# Patient Record
Sex: MALE | Race: WHITE | URBAN - METROPOLITAN AREA
[De-identification: names, ages, dates, MRNs, and addresses within clinical notes are randomized per-mention and may not be internally consistent; named-entity substitution may affect disease eponyms.]

---

## 2017-01-24 RX ORDER — ALFUZOSIN HYDROCHLORIDE 10 MG/1
TABLET, EXTENDED RELEASE ORAL
Qty: 90 TABLET | Refills: 3 | Status: SHIPPED | OUTPATIENT
Start: 2017-01-24 | End: 2018-01-19 | Stop reason: SDUPTHER

## 2017-10-13 DIAGNOSIS — N40.1 BENIGN PROSTATIC HYPERPLASIA WITH LOWER URINARY TRACT SYMPTOMS, UNSPECIFIED MORPHOLOGY: ICD-10-CM

## 2017-10-13 DIAGNOSIS — N40.1 BENIGN PROSTATIC HYPERPLASIA WITH LOWER URINARY TRACT SYMPTOMS, UNSPECIFIED MORPHOLOGY: Primary | ICD-10-CM

## 2017-10-13 LAB — PROSTATE SPECIFIC ANTIGEN: 2.13 NG/ML (ref 0–3.89)

## 2017-10-20 ENCOUNTER — OFFICE VISIT (OUTPATIENT)
Dept: UROLOGY | Age: 52
End: 2017-10-20

## 2017-10-20 VITALS
HEIGHT: 70 IN | HEART RATE: 65 BPM | WEIGHT: 185 LBS | BODY MASS INDEX: 26.48 KG/M2 | DIASTOLIC BLOOD PRESSURE: 80 MMHG | SYSTOLIC BLOOD PRESSURE: 122 MMHG

## 2017-10-20 DIAGNOSIS — N40.1 BPH WITH OBSTRUCTION/LOWER URINARY TRACT SYMPTOMS: Primary | ICD-10-CM

## 2017-10-20 DIAGNOSIS — N13.8 BPH WITH OBSTRUCTION/LOWER URINARY TRACT SYMPTOMS: Primary | ICD-10-CM

## 2017-10-20 PROCEDURE — 1036F TOBACCO NON-USER: CPT | Performed by: UROLOGY

## 2017-10-20 PROCEDURE — 3017F COLORECTAL CA SCREEN DOC REV: CPT | Performed by: UROLOGY

## 2017-10-20 PROCEDURE — 99213 OFFICE O/P EST LOW 20 MIN: CPT | Performed by: UROLOGY

## 2017-10-20 PROCEDURE — G8484 FLU IMMUNIZE NO ADMIN: HCPCS | Performed by: UROLOGY

## 2017-10-20 PROCEDURE — G8427 DOCREV CUR MEDS BY ELIG CLIN: HCPCS | Performed by: UROLOGY

## 2017-10-20 PROCEDURE — G8419 CALC BMI OUT NRM PARAM NOF/U: HCPCS | Performed by: UROLOGY

## 2017-10-20 ASSESSMENT — ENCOUNTER SYMPTOMS: SHORTNESS OF BREATH: 0

## 2017-10-20 NOTE — PROGRESS NOTES
Respiratory: Negative for shortness of breath. Cardiovascular: Negative for chest pain. Genitourinary: Negative for difficulty urinating, dysuria, enuresis, flank pain and hematuria. Objective:   Physical Exam   Constitutional: He appears well-developed and well-nourished. Genitourinary: Rectum normal. Rectal exam shows no external hemorrhoid. Prostate is enlarged. Prostate is not tender. Genitourinary Comments: Prostate remains irregular Lt > Rt but no nodules, stable. Neurological: He is alert. Psychiatric: He has a normal mood and affect. His behavior is normal.         PSA   Date Value Ref Range Status   10/13/2017 2.13 0.00 - 3.89 ng/mL Final   09/09/2016 1.92 0.00 - 3.89 ng/mL Final   10/16/2012 2.81 0.00 - 4.00 ng/mL Final   09/14/2012 5.1 (H) 0.0 - 4.0 ng/mL Final     Comment:     [facility], [facility] [address] [place], [address]  INTERPRETIVE INFORMATION: Prostate Specific Antigen  The Roche Modular E170 PSA method is used. Results obtained  with different assay methods or kits cannot be used  interchangeably. The Roche Modular E170 PSA method is  approved for use as an aid in the detection of prostate  cancer when used in conjunction with a digital rectal exam  in men age 48 and older. The Roche Modular E170 PSA method  is also indicated for the serial measurement of PSA to aid  in the prognosis and management of prostate cancer  patients. Elevated PSA concentrations can only suggest the  presence of prostate cancer until biopsy is performed. PSA  concentrations can also be elevated in benign prostatic  hyperplasia or inflammatory conditions of the prostate. PSA  is generally not elevated in healthy men or men with  non-prostatic carcinoma. Diagnostic Psa   Date Value Ref Range Status   05/10/2014 1.65 0.00 - 3.89 ng/mL Final       Assessment: This is a 45 yo white male with prior h/o UTI, irregular LATOSHA/elevated PSA (stable), BPH w/LUTs on alfuzosin (stable).  I reassured the pt of my findings today and recommend continue with present management for LUTs. Plan:      1.  F/U 1 yr for PSA

## 2018-01-19 RX ORDER — ALFUZOSIN HYDROCHLORIDE 10 MG/1
TABLET, EXTENDED RELEASE ORAL
Qty: 90 TABLET | Refills: 3 | Status: SHIPPED | OUTPATIENT
Start: 2018-01-19 | End: 2019-01-14 | Stop reason: SDUPTHER

## 2018-10-23 ENCOUNTER — TELEPHONE (OUTPATIENT)
Dept: UROLOGY | Age: 53
End: 2018-10-23

## 2018-10-23 DIAGNOSIS — N40.1 BPH WITH OBSTRUCTION/LOWER URINARY TRACT SYMPTOMS: Primary | ICD-10-CM

## 2018-10-23 DIAGNOSIS — N13.8 BPH WITH OBSTRUCTION/LOWER URINARY TRACT SYMPTOMS: Primary | ICD-10-CM

## 2018-10-23 NOTE — TELEPHONE ENCOUNTER
----- Message from Jamil Lu sent at 10/23/2018 11:29 AM EDT -----  Pt needs new psa order for future appt on 11/12/18

## 2018-11-09 DIAGNOSIS — N40.1 BPH WITH OBSTRUCTION/LOWER URINARY TRACT SYMPTOMS: ICD-10-CM

## 2018-11-09 DIAGNOSIS — N13.8 BPH WITH OBSTRUCTION/LOWER URINARY TRACT SYMPTOMS: ICD-10-CM

## 2018-11-09 LAB — PROSTATE SPECIFIC ANTIGEN: 2.61 NG/ML (ref 0–3.89)

## 2018-11-12 ENCOUNTER — OFFICE VISIT (OUTPATIENT)
Dept: UROLOGY | Age: 53
End: 2018-11-12
Payer: COMMERCIAL

## 2018-11-12 VITALS
HEART RATE: 64 BPM | BODY MASS INDEX: 30.35 KG/M2 | HEIGHT: 70 IN | DIASTOLIC BLOOD PRESSURE: 72 MMHG | SYSTOLIC BLOOD PRESSURE: 122 MMHG | WEIGHT: 212 LBS

## 2018-11-12 DIAGNOSIS — N40.1 BPH WITH OBSTRUCTION/LOWER URINARY TRACT SYMPTOMS: Primary | ICD-10-CM

## 2018-11-12 DIAGNOSIS — N13.8 BPH WITH OBSTRUCTION/LOWER URINARY TRACT SYMPTOMS: Primary | ICD-10-CM

## 2018-11-12 PROCEDURE — 1036F TOBACCO NON-USER: CPT | Performed by: UROLOGY

## 2018-11-12 PROCEDURE — G8427 DOCREV CUR MEDS BY ELIG CLIN: HCPCS | Performed by: UROLOGY

## 2018-11-12 PROCEDURE — G8417 CALC BMI ABV UP PARAM F/U: HCPCS | Performed by: UROLOGY

## 2018-11-12 PROCEDURE — 3017F COLORECTAL CA SCREEN DOC REV: CPT | Performed by: UROLOGY

## 2018-11-12 PROCEDURE — G8484 FLU IMMUNIZE NO ADMIN: HCPCS | Performed by: UROLOGY

## 2018-11-12 PROCEDURE — 99213 OFFICE O/P EST LOW 20 MIN: CPT | Performed by: UROLOGY

## 2018-11-12 ASSESSMENT — ENCOUNTER SYMPTOMS: SHORTNESS OF BREATH: 0

## 2019-01-14 RX ORDER — ALFUZOSIN HYDROCHLORIDE 10 MG/1
TABLET, EXTENDED RELEASE ORAL
Qty: 90 TABLET | Refills: 3 | Status: SHIPPED | OUTPATIENT
Start: 2019-01-14 | End: 2020-01-09

## 2020-01-13 RX ORDER — ALFUZOSIN HYDROCHLORIDE 10 MG/1
TABLET, EXTENDED RELEASE ORAL
Qty: 90 TABLET | Refills: 4 | Status: SHIPPED | OUTPATIENT
Start: 2020-01-13 | End: 2021-04-07

## 2020-01-16 ENCOUNTER — TELEPHONE (OUTPATIENT)
Dept: UROLOGY | Age: 55
End: 2020-01-16

## 2020-01-18 DIAGNOSIS — N13.8 BPH WITH OBSTRUCTION/LOWER URINARY TRACT SYMPTOMS: ICD-10-CM

## 2020-01-18 DIAGNOSIS — N40.1 BPH WITH OBSTRUCTION/LOWER URINARY TRACT SYMPTOMS: ICD-10-CM

## 2020-01-19 LAB — PROSTATE SPECIFIC ANTIGEN: 2.86 NG/ML (ref 0–3.89)

## 2020-01-24 ENCOUNTER — TELEPHONE (OUTPATIENT)
Dept: UROLOGY | Age: 55
End: 2020-01-24

## 2020-01-24 ENCOUNTER — OFFICE VISIT (OUTPATIENT)
Dept: UROLOGY | Age: 55
End: 2020-01-24
Payer: COMMERCIAL

## 2020-01-24 VITALS
SYSTOLIC BLOOD PRESSURE: 122 MMHG | WEIGHT: 200 LBS | HEIGHT: 70 IN | HEART RATE: 72 BPM | BODY MASS INDEX: 28.63 KG/M2 | DIASTOLIC BLOOD PRESSURE: 80 MMHG

## 2020-01-24 PROCEDURE — G8427 DOCREV CUR MEDS BY ELIG CLIN: HCPCS | Performed by: UROLOGY

## 2020-01-24 PROCEDURE — G8419 CALC BMI OUT NRM PARAM NOF/U: HCPCS | Performed by: UROLOGY

## 2020-01-24 PROCEDURE — 3017F COLORECTAL CA SCREEN DOC REV: CPT | Performed by: UROLOGY

## 2020-01-24 PROCEDURE — G8484 FLU IMMUNIZE NO ADMIN: HCPCS | Performed by: UROLOGY

## 2020-01-24 PROCEDURE — 1036F TOBACCO NON-USER: CPT | Performed by: UROLOGY

## 2020-01-24 PROCEDURE — 99213 OFFICE O/P EST LOW 20 MIN: CPT | Performed by: UROLOGY

## 2020-01-24 ASSESSMENT — ENCOUNTER SYMPTOMS
ABDOMINAL PAIN: 0
SHORTNESS OF BREATH: 0
ABDOMINAL DISTENTION: 0

## 2020-01-24 NOTE — PROGRESS NOTES
Subjective:      Patient ID: Rose Marie Erazo is a 47 y.o. male. HPI  This is a 46 yo white male with h/o UTI, irregular LATOSHA/elevated PSA, BPH w/LUTs on alfuzosin back in follow-up. Since last seen on 11/12/18, he has a good fos, no hematuria, no dysuria and no pain. He has no interval UTI's. He has no urgency or incontinence and denies frequency. He has no new surgical problems. I reviewed his interval PSA today. He has no SE from alfuzosin. He is being followed at [facility] for rib lesion that is felt to be benign but he wants a new PCP. He has no other complaints.         Trus/Prostate biopsy 10/12: neg  Cystoscopy for microhematuria 10/12: mild bulbous stricture, neg otherwise  Cytol/FISH : neg       Past Medical History:   Diagnosis Date    Hyperlipidemia     Incomplete bladder emptying     Prostate troubles      Past Surgical History:   Procedure Laterality Date    CYSTOSCOPY      PROSTATE SURGERY      biopsy    TONSILLECTOMY      VASECTOMY  2012     Social History     Socioeconomic History    Marital status:      Spouse name: None    Number of children: None    Years of education: None    Highest education level: None   Occupational History    None   Social Needs    Financial resource strain: None    Food insecurity:     Worry: None     Inability: None    Transportation needs:     Medical: None     Non-medical: None   Tobacco Use    Smoking status: Former Smoker     Types: Cigarettes     Last attempt to quit: 1/2/2017     Years since quitting: 3.0    Smokeless tobacco: Never Used   Substance and Sexual Activity    Alcohol use:  Yes     Alcohol/week: 2.0 standard drinks     Types: 2 Cans of beer per week     Comment: a year    Drug use: No    Sexual activity: None   Lifestyle    Physical activity:     Days per week: None     Minutes per session: None    Stress: None   Relationships    Social connections:     Talks on phone: None     Gets together: None     Attends Buddhism service: None     Active member of club or organization: None     Attends meetings of clubs or organizations: None     Relationship status: None    Intimate partner violence:     Fear of current or ex partner: None     Emotionally abused: None     Physically abused: None     Forced sexual activity: None   Other Topics Concern    None   Social History Narrative    None     Family History   Problem Relation Age of Onset    Cancer Mother         bladder    Arthritis Mother     High Blood Pressure Mother     Heart Attack Father     Alzheimer's Disease Father     Stroke Father     High Blood Pressure Father     Heart Disease Father      Current Outpatient Medications   Medication Sig Dispense Refill    alfuzosin (UROXATRAL) 10 MG extended release tablet TAKE 1 TABLET DAILY 90 tablet 4     No current facility-administered medications for this visit. Ciprofloxacin  reviewed      Review of Systems   Constitutional: Negative for fever and unexpected weight change. Respiratory: Negative for shortness of breath. Cardiovascular: Negative for chest pain. Gastrointestinal: Negative for abdominal distention and abdominal pain. Genitourinary: Negative for dysuria, flank pain and hematuria. Objective:   Physical Exam  Genitourinary:     Prostate: Enlarged. Not tender and no nodules present. Rectum: No external hemorrhoid. Comments: Prostate remains irregular Lt > Rt at apex but stable. PSA at [facility] 11/1/19: 3.08 ng/ml  PSA   Date Value Ref Range Status   01/18/2020 2.86 0.00 - 3.89 ng/mL Final   11/09/2018 2.61 0.00 - 3.89 ng/mL Final   10/13/2017 2.13 0.00 - 3.89 ng/mL Final   09/09/2016 1.92 0.00 - 3.89 ng/mL Final   10/16/2012 2.81 0.00 - 4.00 ng/mL Final     Diagnostic Psa   Date Value Ref Range Status   05/10/2014 1.65 0.00 - 3.89 ng/mL Final       Assessment:       This is a 48 yo white male with h/o UTI (not recurrent), irregular LATOSHA with normal PSA (stable) and prior negative biopsy, BPH w/LUTs on alfuzosin (stable). I recommend he continue with yearly follow-up and will get him set up with [facility] PCP in [facility]. Plan:      1. F/u 1 yr for PSA  2.  Refer to Dr Terri Evans at [facility] as new PCP        Reba Briceño MD

## 2021-01-16 DIAGNOSIS — N40.1 BPH WITH OBSTRUCTION/LOWER URINARY TRACT SYMPTOMS: ICD-10-CM

## 2021-01-16 DIAGNOSIS — N13.8 BPH WITH OBSTRUCTION/LOWER URINARY TRACT SYMPTOMS: ICD-10-CM

## 2021-01-16 LAB — PROSTATE SPECIFIC ANTIGEN: 2.65 NG/ML (ref 0–3.89)

## 2021-01-22 ENCOUNTER — OFFICE VISIT (OUTPATIENT)
Dept: UROLOGY | Age: 56
End: 2021-01-22
Payer: COMMERCIAL

## 2021-01-22 VITALS
BODY MASS INDEX: 30.06 KG/M2 | WEIGHT: 210 LBS | SYSTOLIC BLOOD PRESSURE: 122 MMHG | HEIGHT: 70 IN | DIASTOLIC BLOOD PRESSURE: 80 MMHG | HEART RATE: 65 BPM

## 2021-01-22 DIAGNOSIS — N40.1 BPH WITH OBSTRUCTION/LOWER URINARY TRACT SYMPTOMS: Primary | ICD-10-CM

## 2021-01-22 DIAGNOSIS — N13.8 BPH WITH OBSTRUCTION/LOWER URINARY TRACT SYMPTOMS: Primary | ICD-10-CM

## 2021-01-22 PROCEDURE — 3017F COLORECTAL CA SCREEN DOC REV: CPT | Performed by: UROLOGY

## 2021-01-22 PROCEDURE — G8484 FLU IMMUNIZE NO ADMIN: HCPCS | Performed by: UROLOGY

## 2021-01-22 PROCEDURE — G8427 DOCREV CUR MEDS BY ELIG CLIN: HCPCS | Performed by: UROLOGY

## 2021-01-22 PROCEDURE — G8417 CALC BMI ABV UP PARAM F/U: HCPCS | Performed by: UROLOGY

## 2021-01-22 PROCEDURE — 1036F TOBACCO NON-USER: CPT | Performed by: UROLOGY

## 2021-01-22 PROCEDURE — 99213 OFFICE O/P EST LOW 20 MIN: CPT | Performed by: UROLOGY

## 2021-01-22 ASSESSMENT — ENCOUNTER SYMPTOMS
ABDOMINAL PAIN: 0
ABDOMINAL DISTENTION: 0

## 2021-01-22 NOTE — PROGRESS NOTES
Subjective:      Patient ID: Ricardo Restrepo is a 54 y.o. male. HPI  This is a 45 yo white male with h/o UTI, irregular LATOSHA/elevated PSA, BPH w/LUTs on alfuzosin back in follow-up. Since last seen on 20, he has a good fos, no hematuria, no dysuria and no pain. He has no interval UTI's. He has no urgency or incontinence and denies frequency. He has no new surgical or medical problems. I reviewed his interval PSA today. He has no SE from alfuzosin. He has no other complaints.         Trus/Prostate biopsy 10/12: neg  Cystoscopy for microhematuria 10/12: mild bulbous stricture, neg otherwise  Cytol/FISH : neg      Past Medical History:   Diagnosis Date    Hyperlipidemia     Incomplete bladder emptying     Prostate troubles      Past Surgical History:   Procedure Laterality Date    CYSTOSCOPY      PROSTATE SURGERY      biopsy    TONSILLECTOMY      VASECTOMY  2012     Social History     Socioeconomic History    Marital status:      Spouse name: None    Number of children: None    Years of education: None    Highest education level: None   Occupational History    None   Social Needs    Financial resource strain: None    Food insecurity     Worry: None     Inability: None    Transportation needs     Medical: None     Non-medical: None   Tobacco Use    Smoking status: Former Smoker     Types: Cigarettes     Quit date: 2017     Years since quittin.0    Smokeless tobacco: Never Used   Substance and Sexual Activity    Alcohol use:  Yes     Alcohol/week: 2.0 standard drinks     Types: 2 Cans of beer per week     Comment: a year    Drug use: No    Sexual activity: None   Lifestyle    Physical activity     Days per week: None     Minutes per session: None    Stress: None   Relationships    Social connections     Talks on phone: None     Gets together: None     Attends Anabaptist service: None     Active member of club or organization: None Attends meetings of clubs or organizations: None     Relationship status: None    Intimate partner violence     Fear of current or ex partner: None     Emotionally abused: None     Physically abused: None     Forced sexual activity: None   Other Topics Concern    None   Social History Narrative    None     Family History   Problem Relation Age of Onset    Cancer Mother         bladder    Arthritis Mother     High Blood Pressure Mother     Heart Attack Father     Alzheimer's Disease Father     Stroke Father     High Blood Pressure Father     Heart Disease Father      Current Outpatient Medications   Medication Sig Dispense Refill    alfuzosin (UROXATRAL) 10 MG extended release tablet TAKE 1 TABLET DAILY 90 tablet 4     No current facility-administered medications for this visit. Ciprofloxacin  reviewed    Review of Systems   Constitutional: Negative for unexpected weight change. Gastrointestinal: Negative for abdominal distention and abdominal pain. Genitourinary: Negative for decreased urine volume, dysuria, flank pain and hematuria. Objective:   Physical Exam  Constitutional:       Appearance: Normal appearance. Genitourinary:     Prostate: Enlarged. Not tender. Rectum: No external hemorrhoid. Comments: Prostate remains irregular Lt > Rt at apex but stable. Neurological:      Mental Status: He is alert. PSA   Date Value Ref Range Status   01/16/2021 2.65 0.00 - 3.89 ng/mL Final   01/18/2020 2.86 0.00 - 3.89 ng/mL Final   11/09/2018 2.61 0.00 - 3.89 ng/mL Final   10/13/2017 2.13 0.00 - 3.89 ng/mL Final   09/09/2016 1.92 0.00 - 3.89 ng/mL Final     Diagnostic Psa   Date Value Ref Range Status   05/10/2014 1.65 0.00 - 3.89 ng/mL Final       Assessment: This is a 45 yo white male with h/o UTI (not recurrent), irregular LATOSHA with normal PSA (stable) and prior negative biopsy, BPH w/LUTs on alfuzosin (stable). I recommend he continue with yearly follow-up. Plan:      1.  F/U 1 yr for PSA        Karly Ellis MD

## 2021-04-07 RX ORDER — ALFUZOSIN HYDROCHLORIDE 10 MG/1
TABLET, EXTENDED RELEASE ORAL
Qty: 90 TABLET | Refills: 3 | Status: SHIPPED | OUTPATIENT
Start: 2021-04-07 | End: 2022-04-04

## 2022-01-19 DIAGNOSIS — N13.8 BPH WITH OBSTRUCTION/LOWER URINARY TRACT SYMPTOMS: ICD-10-CM

## 2022-01-19 DIAGNOSIS — N40.1 BPH WITH OBSTRUCTION/LOWER URINARY TRACT SYMPTOMS: ICD-10-CM

## 2022-01-19 LAB — PROSTATE SPECIFIC ANTIGEN: 3.54 NG/ML (ref 0–4)

## 2022-01-21 ENCOUNTER — OFFICE VISIT (OUTPATIENT)
Dept: UROLOGY | Age: 57
End: 2022-01-21
Payer: COMMERCIAL

## 2022-01-21 VITALS
SYSTOLIC BLOOD PRESSURE: 120 MMHG | DIASTOLIC BLOOD PRESSURE: 80 MMHG | HEART RATE: 61 BPM | HEIGHT: 70 IN | WEIGHT: 205 LBS | BODY MASS INDEX: 29.35 KG/M2

## 2022-01-21 DIAGNOSIS — N13.8 BPH WITH OBSTRUCTION/LOWER URINARY TRACT SYMPTOMS: Primary | ICD-10-CM

## 2022-01-21 DIAGNOSIS — N40.1 BPH WITH OBSTRUCTION/LOWER URINARY TRACT SYMPTOMS: Primary | ICD-10-CM

## 2022-01-21 PROCEDURE — 99213 OFFICE O/P EST LOW 20 MIN: CPT | Performed by: UROLOGY

## 2022-01-21 PROCEDURE — 3017F COLORECTAL CA SCREEN DOC REV: CPT | Performed by: UROLOGY

## 2022-01-21 PROCEDURE — 1036F TOBACCO NON-USER: CPT | Performed by: UROLOGY

## 2022-01-21 PROCEDURE — G8427 DOCREV CUR MEDS BY ELIG CLIN: HCPCS | Performed by: UROLOGY

## 2022-01-21 PROCEDURE — G8417 CALC BMI ABV UP PARAM F/U: HCPCS | Performed by: UROLOGY

## 2022-01-21 PROCEDURE — G8484 FLU IMMUNIZE NO ADMIN: HCPCS | Performed by: UROLOGY

## 2022-01-21 ASSESSMENT — ENCOUNTER SYMPTOMS
ABDOMINAL PAIN: 0
ABDOMINAL DISTENTION: 0

## 2022-01-21 NOTE — PROGRESS NOTES
Subjective:      Patient ID: Oliver Ordonez is a 64 y.o. male    HPI  This is a 62 yo white male with h/o UTI, irregular LATOSHA/elevated PSA, BPH w/LUTs on alfuzosin back in follow-up. Since last seen on 21, he has a good fos, no hematuria, no dysuria and no pain. He has no interval UTI's. He has no urgency or incontinence and denies frequency. He has no new surgical or medical problems. I reviewed his interval PSA today. He has no SE from alfuzosin. He has no other complaints.         Trus/Prostate biopsy 10/12: neg  Cystoscopy for microhematuria 10/12: mild bulbous stricture, neg otherwise  Cytol/FISH : neg    Past Medical History:   Diagnosis Date    Hyperlipidemia     Incomplete bladder emptying     Prostate troubles      Past Surgical History:   Procedure Laterality Date    CYSTOSCOPY      PROSTATE SURGERY      biopsy    TONSILLECTOMY      VASECTOMY       Social History     Socioeconomic History    Marital status:      Spouse name: None    Number of children: None    Years of education: None    Highest education level: None   Occupational History    None   Tobacco Use    Smoking status: Former Smoker     Types: Cigarettes     Quit date: 2017     Years since quittin.0    Smokeless tobacco: Never Used   Substance and Sexual Activity    Alcohol use: Yes     Alcohol/week: 2.0 standard drinks     Types: 2 Cans of beer per week     Comment: a year    Drug use: No    Sexual activity: None   Other Topics Concern    None   Social History Narrative    None     Social Determinants of Health     Financial Resource Strain:     Difficulty of Paying Living Expenses: Not on file   Food Insecurity:     Worried About Running Out of Food in the Last Year: Not on file    Madelyn of Food in the Last Year: Not on file   Transportation Needs:     Lack of Transportation (Medical): Not on file    Lack of Transportation (Non-Medical):  Not on file   Physical Activity:     Days of Exercise per Week: Not on file    Minutes of Exercise per Session: Not on file   Stress:     Feeling of Stress : Not on file   Social Connections:     Frequency of Communication with Friends and Family: Not on file    Frequency of Social Gatherings with Friends and Family: Not on file    Attends Buddhism Services: Not on file    Active Member of [address] [place] [address] [employer] or Organizations: Not on file    Attends Club or Organization Meetings: Not on file    Marital Status: Not on file   Intimate Partner Violence:     Fear of Current or Ex-Partner: Not on file    Emotionally Abused: Not on file    Physically Abused: Not on file    Sexually Abused: Not on file   Housing Stability:     Unable to Pay for Housing in the Last Year: Not on file    Number of [place] in the Last Year: Not on file    Unstable Housing in the Last Year: Not on file     Family History   Problem Relation Age of Onset    Cancer Mother         bladder    Arthritis Mother     High Blood Pressure Mother     Heart Attack Father     Alzheimer's Disease Father     Stroke Father     High Blood Pressure Father     Heart Disease Father      Current Outpatient Medications   Medication Sig Dispense Refill    alfuzosin (UROXATRAL) 10 MG extended release tablet TAKE 1 TABLET DAILY (NEED APPOINTMENT) 90 tablet 3     No current facility-administered medications for this visit. Ciprofloxacin  reviewed      Review of Systems   Constitutional: Negative for unexpected weight change. Gastrointestinal: Negative for abdominal distention and abdominal pain. Genitourinary: Negative for difficulty urinating, dysuria, flank pain and hematuria. Objective:   Physical Exam  Genitourinary:     Prostate: Enlarged. Not tender. Rectum: No external hemorrhoid. Comments: Prostate remains irregular Lt > Rt at apex but stable.              PSA   Date Value Ref Range Status   01/19/2022 3.54 0.00 - 4.00 ng/mL Final   01/16/2021 2.65 0.00 - 3.89 ng/mL Final 01/18/2020 2.86 0.00 - 3.89 ng/mL Final   11/09/2018 2.61 0.00 - 3.89 ng/mL Final   10/13/2017 2.13 0.00 - 3.89 ng/mL Final     Diagnostic Psa   Date Value Ref Range Status   05/10/2014 1.65 0.00 - 3.89 ng/mL Final       Assessment: This is a 64 yo white male with h/o UTI (not recurrent), irregular LATOSHA with normal PSA (stable) and prior negative biopsy, BPH w/LUTs on alfuzosin (stable). I recommend the PSA is followed more closely given slight rise in the normal range and he agrees. The option of prostate biopsy vs MRI was also discussed. Plan:      1.  F/U 6 mo for PSA        Sanjuana Ricci MD

## 2022-04-04 RX ORDER — ALFUZOSIN HYDROCHLORIDE 10 MG/1
TABLET, EXTENDED RELEASE ORAL
Qty: 90 TABLET | Refills: 3 | Status: SHIPPED | OUTPATIENT
Start: 2022-04-04

## 2022-07-07 DIAGNOSIS — N13.8 BPH WITH OBSTRUCTION/LOWER URINARY TRACT SYMPTOMS: ICD-10-CM

## 2022-07-07 DIAGNOSIS — N40.1 BPH WITH OBSTRUCTION/LOWER URINARY TRACT SYMPTOMS: ICD-10-CM

## 2022-07-07 LAB — PROSTATE SPECIFIC ANTIGEN: 3.26 NG/ML (ref 0–4)

## 2022-07-13 ENCOUNTER — OFFICE VISIT (OUTPATIENT)
Dept: UROLOGY | Age: 57
End: 2022-07-13
Payer: COMMERCIAL

## 2022-07-13 VITALS
BODY MASS INDEX: 29.35 KG/M2 | HEIGHT: 70 IN | SYSTOLIC BLOOD PRESSURE: 116 MMHG | DIASTOLIC BLOOD PRESSURE: 68 MMHG | HEART RATE: 64 BPM | WEIGHT: 205 LBS

## 2022-07-13 DIAGNOSIS — N40.0 BPH WITHOUT OBSTRUCTION/LOWER URINARY TRACT SYMPTOMS: Primary | ICD-10-CM

## 2022-07-13 PROCEDURE — 99213 OFFICE O/P EST LOW 20 MIN: CPT | Performed by: UROLOGY

## 2022-07-13 PROCEDURE — 1036F TOBACCO NON-USER: CPT | Performed by: UROLOGY

## 2022-07-13 PROCEDURE — G8417 CALC BMI ABV UP PARAM F/U: HCPCS | Performed by: UROLOGY

## 2022-07-13 PROCEDURE — G8427 DOCREV CUR MEDS BY ELIG CLIN: HCPCS | Performed by: UROLOGY

## 2022-07-13 PROCEDURE — 3017F COLORECTAL CA SCREEN DOC REV: CPT | Performed by: UROLOGY

## 2022-07-13 ASSESSMENT — ENCOUNTER SYMPTOMS
ABDOMINAL PAIN: 0
ABDOMINAL DISTENTION: 0

## 2022-07-13 NOTE — PROGRESS NOTES
Subjective:      Patient ID: Alana Bynum is a 62 y.o. male    HPI  This is a 65 yo white male with h/o UTI, irregular LATOSHA/elevated PSA, BPH w/LUTs on alfuzosin back in follow-up. Since last seen on 22, he has a good fos, no hematuria, no dysuria and no pain. He has no interval UTI's. He has no urgency or UI and denies frequency. He has no new surgical or medical problems. I reviewed his interval PSA today. He has no other complaints.         Trus/Prostate biopsy 10/12: neg  Cystoscopy for microhematuria 10/12: mild bulbous stricture, neg otherwise  Cytol/FISH : neg    Past Medical History:   Diagnosis Date    Hyperlipidemia     Incomplete bladder emptying     Prostate troubles      Past Surgical History:   Procedure Laterality Date    CYSTOSCOPY      PROSTATE SURGERY      biopsy    TONSILLECTOMY      VASECTOMY       Social History     Socioeconomic History    Marital status:      Spouse name: None    Number of children: None    Years of education: None    Highest education level: None   Occupational History    None   Tobacco Use    Smoking status: Former Smoker     Types: Cigarettes     Quit date: 2017     Years since quittin.5    Smokeless tobacco: Never Used   Substance and Sexual Activity    Alcohol use: Yes     Alcohol/week: 2.0 standard drinks     Types: 2 Cans of beer per week     Comment: a year    Drug use: No    Sexual activity: None   Other Topics Concern    None   Social History Narrative    None     Social Determinants of Health     Financial Resource Strain:     Difficulty of Paying Living Expenses: Not on file   Food Insecurity:     Worried About Running Out of Food in the Last Year: Not on file    Madelyn of Food in the Last Year: Not on file   Transportation Needs:     Lack of Transportation (Medical): Not on file    Lack of Transportation (Non-Medical):  Not on file   Physical Activity:     Days of Exercise per Week: Not on file    Minutes of Exercise per Session: Not on file   Stress:     Feeling of Stress : Not on file   Social Connections:     Frequency of Communication with Friends and Family: Not on file    Frequency of Social Gatherings with Friends and Family: Not on file    Attends Congregation Services: Not on file    Active Member of Clubs or Organizations: Not on file    Attends Club or Organization Meetings: Not on file    Marital Status: Not on file   Intimate Partner Violence:     Fear of Current or Ex-Partner: Not on file    Emotionally Abused: Not on file    Physically Abused: Not on file    Sexually Abused: Not on file   Housing Stability:     Unable to Pay for Housing in the Last Year: Not on file    Number of [place] in the Last Year: Not on file    Unstable Housing in the Last Year: Not on file     Family History   Problem Relation Age of Onset    Cancer Mother         bladder    Arthritis Mother     High Blood Pressure Mother     Heart Attack Father     Alzheimer's Disease Father     Stroke Father     High Blood Pressure Father     Heart Disease Father      Current Outpatient Medications   Medication Sig Dispense Refill    alfuzosin (UROXATRAL) 10 MG extended release tablet TAKE 1 TABLET DAILY (NEED APPOINTMENT) 90 tablet 3     No current facility-administered medications for this visit. Ciprofloxacin  reviewed      Review of Systems   Constitutional: Negative for unexpected weight change. Gastrointestinal: Negative for abdominal distention and abdominal pain. Genitourinary: Negative for dysuria, flank pain, frequency and hematuria. Objective:   Physical Exam  Constitutional:       Appearance: Normal appearance. Genitourinary:     Prostate: Enlarged. Not tender. Rectum: No external hemorrhoid. Comments: Prostate remains irregular Lt > Rt at apex but stable. Neurological:      Mental Status: He is alert.             PSA   Date Value Ref Range Status   07/07/2022 3.26 0.00 - 4.00 ng/mL Final   01/19/2022 3.54 0.00 - 4.00 ng/mL Final   01/16/2021 2.65 0.00 - 3.89 ng/mL Final   01/18/2020 2.86 0.00 - 3.89 ng/mL Final   11/09/2018 2.61 0.00 - 3.89 ng/mL Final     Diagnostic Psa   Date Value Ref Range Status   05/10/2014 1.65 0.00 - 3.89 ng/mL Final       Assessment: This is a 63 yo white male with h/o UTI (not recurrent), irregular LATOSHA with normal PSA (stable) and prior negative biopsy, BPH w/LUTs on alfuzosin (stable). I recommend continued closer PSA follow-up and he agrees.                   Plan:      1.  F/u 6 mo for PSA        Meron Lane MD

## 2023-01-17 ENCOUNTER — TELEPHONE (OUTPATIENT)
Dept: UROLOGY | Age: 58
End: 2023-01-17

## 2023-01-17 DIAGNOSIS — N40.0 BPH WITHOUT OBSTRUCTION/LOWER URINARY TRACT SYMPTOMS: ICD-10-CM

## 2023-01-17 DIAGNOSIS — N40.0 BPH WITHOUT OBSTRUCTION/LOWER URINARY TRACT SYMPTOMS: Primary | ICD-10-CM

## 2023-01-17 LAB — PROSTATE SPECIFIC ANTIGEN: 3.29 NG/ML (ref 0–4)

## 2023-02-20 ENCOUNTER — OFFICE VISIT (OUTPATIENT)
Dept: UROLOGY | Age: 58
End: 2023-02-20
Payer: COMMERCIAL

## 2023-02-20 VITALS
DIASTOLIC BLOOD PRESSURE: 76 MMHG | HEIGHT: 70 IN | HEART RATE: 74 BPM | SYSTOLIC BLOOD PRESSURE: 126 MMHG | WEIGHT: 205 LBS | BODY MASS INDEX: 29.35 KG/M2

## 2023-02-20 DIAGNOSIS — N40.1 BPH WITH OBSTRUCTION/LOWER URINARY TRACT SYMPTOMS: Primary | ICD-10-CM

## 2023-02-20 DIAGNOSIS — N13.8 BPH WITH OBSTRUCTION/LOWER URINARY TRACT SYMPTOMS: Primary | ICD-10-CM

## 2023-02-20 PROCEDURE — 1036F TOBACCO NON-USER: CPT | Performed by: UROLOGY

## 2023-02-20 PROCEDURE — G8484 FLU IMMUNIZE NO ADMIN: HCPCS | Performed by: UROLOGY

## 2023-02-20 PROCEDURE — G8417 CALC BMI ABV UP PARAM F/U: HCPCS | Performed by: UROLOGY

## 2023-02-20 PROCEDURE — 99213 OFFICE O/P EST LOW 20 MIN: CPT | Performed by: UROLOGY

## 2023-02-20 PROCEDURE — G8427 DOCREV CUR MEDS BY ELIG CLIN: HCPCS | Performed by: UROLOGY

## 2023-02-20 PROCEDURE — 3017F COLORECTAL CA SCREEN DOC REV: CPT | Performed by: UROLOGY

## 2023-02-20 RX ORDER — ALFUZOSIN HYDROCHLORIDE 10 MG/1
10 TABLET, EXTENDED RELEASE ORAL DAILY
Qty: 90 TABLET | Refills: 3 | Status: SHIPPED | OUTPATIENT
Start: 2023-02-20

## 2023-02-20 ASSESSMENT — ENCOUNTER SYMPTOMS
ABDOMINAL PAIN: 0
ABDOMINAL DISTENTION: 0

## 2023-02-20 NOTE — PROGRESS NOTES
Subjective:      Patient ID: John Lares is a 62 y.o. male    HPI  This is a 61 yo white male with h/o UTI, irregular LATOSHA/elevated PSA, BPH w/LUTs on alfuzosin back in follow-up. Since last seen on 22, he has a good fos, no hematuria, no dysuria and no pain. He has no interval UTI's. He has no urgency or UI and denies frequency. He has no new surgical or medical problems. I reviewed his interval PSA today. He has no other complaints. Trus/Prostate biopsy 10/12: neg  Cystoscopy for microhematuria 10/12: mild bulbous stricture, neg otherwise  Cytol/FISH : neg    Past Medical History:   Diagnosis Date    Hyperlipidemia     Incomplete bladder emptying     Prostate troubles      Past Surgical History:   Procedure Laterality Date    CYSTOSCOPY      PROSTATE SURGERY      biopsy    TONSILLECTOMY      VASECTOMY       Social History     Socioeconomic History    Marital status:      Spouse name: None    Number of children: None    Years of education: None    Highest education level: None   Tobacco Use    Smoking status: Former     Types: Cigarettes     Quit date: 2017     Years since quittin.1    Smokeless tobacco: Never   Substance and Sexual Activity    Alcohol use: Yes     Alcohol/week: 2.0 standard drinks     Types: 2 Cans of beer per week     Comment: a year    Drug use: No     Family History   Problem Relation Age of Onset    Cancer Mother         bladder    Arthritis Mother     High Blood Pressure Mother     Heart Attack Father     Alzheimer's Disease Father     Stroke Father     High Blood Pressure Father     Heart Disease Father      Current Outpatient Medications   Medication Sig Dispense Refill    alfuzosin (UROXATRAL) 10 MG extended release tablet Take 1 tablet by mouth daily 90 tablet 3    alfuzosin (UROXATRAL) 10 MG extended release tablet TAKE 1 TABLET DAILY (NEED APPOINTMENT) 90 tablet 3     No current facility-administered medications for this visit. Ciprofloxacin  reviewed      Review of Systems   Constitutional:  Negative for unexpected weight change. Gastrointestinal:  Negative for abdominal distention and abdominal pain. Genitourinary:  Negative for dysuria, flank pain and hematuria. Objective:   Physical Exam  Constitutional:       Appearance: Normal appearance. Genitourinary:     Prostate: Enlarged. Not tender. Rectum: No external hemorrhoid. Comments: Prostate remains irregular Lt > Rt at apex but stable. Neurological:      Mental Status: He is alert. PSA   Date Value Ref Range Status   01/17/2023 3.29 0.00 - 4.00 ng/mL Final   07/07/2022 3.26 0.00 - 4.00 ng/mL Final   01/19/2022 3.54 0.00 - 4.00 ng/mL Final   01/16/2021 2.65 0.00 - 3.89 ng/mL Final   01/18/2020 2.86 0.00 - 3.89 ng/mL Final     Diagnostic Psa   Date Value Ref Range Status   05/10/2014 1.65 0.00 - 3.89 ng/mL Final       Assessment: This is a 61 yo white male with h/o UTI (not recurrent), irregular LATOSHA with normal PSA (stable) and prior negative biopsy, BPH w/LUTs on alfuzosin (stable). I recommend continued closer PSA follow-up and he agrees.       Plan:      F/U 6 mo for PSA        Vandana Real MD

## 2023-08-18 DIAGNOSIS — N40.1 BPH WITH OBSTRUCTION/LOWER URINARY TRACT SYMPTOMS: ICD-10-CM

## 2023-08-18 DIAGNOSIS — N13.8 BPH WITH OBSTRUCTION/LOWER URINARY TRACT SYMPTOMS: ICD-10-CM

## 2023-08-18 LAB — PSA SERPL-MCNC: 2.75 NG/ML (ref 0–4)

## 2023-11-07 ENCOUNTER — OFFICE VISIT (OUTPATIENT)
Dept: UROLOGY | Age: 58
End: 2023-11-07
Payer: COMMERCIAL

## 2023-11-07 VITALS
SYSTOLIC BLOOD PRESSURE: 112 MMHG | HEIGHT: 70 IN | WEIGHT: 205 LBS | BODY MASS INDEX: 29.35 KG/M2 | DIASTOLIC BLOOD PRESSURE: 70 MMHG | HEART RATE: 52 BPM

## 2023-11-07 DIAGNOSIS — N40.1 BPH WITH OBSTRUCTION/LOWER URINARY TRACT SYMPTOMS: Primary | ICD-10-CM

## 2023-11-07 DIAGNOSIS — N13.8 BPH WITH OBSTRUCTION/LOWER URINARY TRACT SYMPTOMS: Primary | ICD-10-CM

## 2023-11-07 PROCEDURE — G8484 FLU IMMUNIZE NO ADMIN: HCPCS | Performed by: UROLOGY

## 2023-11-07 PROCEDURE — G8417 CALC BMI ABV UP PARAM F/U: HCPCS | Performed by: UROLOGY

## 2023-11-07 PROCEDURE — G8427 DOCREV CUR MEDS BY ELIG CLIN: HCPCS | Performed by: UROLOGY

## 2023-11-07 PROCEDURE — 3017F COLORECTAL CA SCREEN DOC REV: CPT | Performed by: UROLOGY

## 2023-11-07 PROCEDURE — 99213 OFFICE O/P EST LOW 20 MIN: CPT | Performed by: UROLOGY

## 2023-11-07 PROCEDURE — 1036F TOBACCO NON-USER: CPT | Performed by: UROLOGY

## 2023-11-07 RX ORDER — ALFUZOSIN HYDROCHLORIDE 10 MG/1
10 TABLET, EXTENDED RELEASE ORAL DAILY
Qty: 90 TABLET | Refills: 3 | Status: SHIPPED | OUTPATIENT
Start: 2023-11-07

## 2023-11-07 ASSESSMENT — ENCOUNTER SYMPTOMS: ABDOMINAL PAIN: 0

## 2023-11-07 NOTE — PROGRESS NOTES
Subjective:      Patient ID: Thalia Looney is a 62 y.o. male    HPI  This is a 63 yo white male with h/o UTI, irregular LATOSHA/elevated PSA, BPH w/LUTs on alfuzosin back in follow-up. Since last seen on 23, he has a good fos, no hematuria, no dysuria and no pain. He has no interval UTI's. He has no urgency or UI and denies frequency. He has no new surgical or medical problems. I reviewed his interval PSA today. He has no other complaints. Trus/Prostate biopsy 10/12: neg  Cystoscopy for microhematuria 10/12: mild bulbous stricture, neg otherwise  Cytol/FISH : neg    Past Medical History:   Diagnosis Date    Hyperlipidemia     Incomplete bladder emptying     Prostate troubles      Past Surgical History:   Procedure Laterality Date    CYSTOSCOPY      PROSTATE SURGERY      biopsy    TONSILLECTOMY      VASECTOMY       Social History     Socioeconomic History    Marital status:      Spouse name: None    Number of children: None    Years of education: None    Highest education level: None   Tobacco Use    Smoking status: Former     Types: Cigarettes     Quit date: 2017     Years since quittin.8    Smokeless tobacco: Never   Substance and Sexual Activity    Alcohol use: Yes     Alcohol/week: 2.0 standard drinks of alcohol     Types: 2 Cans of beer per week     Comment: a year    Drug use: No     Family History   Problem Relation Age of Onset    Cancer Mother         bladder    Arthritis Mother     High Blood Pressure Mother     Heart Attack Father     Alzheimer's Disease Father     Stroke Father     High Blood Pressure Father     Heart Disease Father      Current Outpatient Medications   Medication Sig Dispense Refill    alfuzosin (UROXATRAL) 10 MG extended release tablet Take 1 tablet by mouth daily 90 tablet 3     No current facility-administered medications for this visit. Ciprofloxacin  reviewed      Review of Systems   Constitutional:  Negative for unexpected weight change.

## 2024-11-01 RX ORDER — ALFUZOSIN HYDROCHLORIDE 10 MG/1
10 TABLET, EXTENDED RELEASE ORAL DAILY
Qty: 90 TABLET | Refills: 3 | Status: SHIPPED | OUTPATIENT
Start: 2024-11-01

## 2024-11-07 DIAGNOSIS — N40.1 BPH WITH OBSTRUCTION/LOWER URINARY TRACT SYMPTOMS: ICD-10-CM

## 2024-11-07 DIAGNOSIS — N13.8 BPH WITH OBSTRUCTION/LOWER URINARY TRACT SYMPTOMS: ICD-10-CM

## 2024-11-07 LAB — PSA SERPL-MCNC: 7.38 NG/ML (ref 0–4)

## 2024-11-11 ENCOUNTER — OFFICE VISIT (OUTPATIENT)
Dept: UROLOGY | Age: 59
End: 2024-11-11
Payer: COMMERCIAL

## 2024-11-11 VITALS
HEIGHT: 70 IN | WEIGHT: 205 LBS | HEART RATE: 63 BPM | DIASTOLIC BLOOD PRESSURE: 70 MMHG | SYSTOLIC BLOOD PRESSURE: 128 MMHG | BODY MASS INDEX: 29.35 KG/M2

## 2024-11-11 DIAGNOSIS — R97.20 ELEVATED PSA: Primary | ICD-10-CM

## 2024-11-11 PROCEDURE — G8427 DOCREV CUR MEDS BY ELIG CLIN: HCPCS | Performed by: UROLOGY

## 2024-11-11 PROCEDURE — 99213 OFFICE O/P EST LOW 20 MIN: CPT | Performed by: UROLOGY

## 2024-11-11 PROCEDURE — G8419 CALC BMI OUT NRM PARAM NOF/U: HCPCS | Performed by: UROLOGY

## 2024-11-11 PROCEDURE — 3017F COLORECTAL CA SCREEN DOC REV: CPT | Performed by: UROLOGY

## 2024-11-11 PROCEDURE — 1036F TOBACCO NON-USER: CPT | Performed by: UROLOGY

## 2024-11-11 PROCEDURE — G8484 FLU IMMUNIZE NO ADMIN: HCPCS | Performed by: UROLOGY

## 2024-11-11 ASSESSMENT — ENCOUNTER SYMPTOMS: ABDOMINAL PAIN: 0

## 2024-11-11 NOTE — PROGRESS NOTES
Subjective:      Patient ID: Charles Jackson is a 59 y.o. male    HPI  This is a 60 yo white male with h/o UTI, irregular LATOSHA/elevated PSA, BPH w/LUTs on alfuzosin back in follow-up. Since last seen on 23, he has a good fos, no hematuria, no dysuria and no pain. He has no interval UTI's. He has no urgency or UI and denies frequency. He has no new surgical or medical problems. I reviewed his interval PSA today. He has no other complaints.         Trus/Prostate biopsy 10/12: neg  Cystoscopy for microhematuria 10/12: mild bulbous stricture, neg otherwise  Cytol/FISH : neg       Past Medical History:   Diagnosis Date    Hyperlipidemia     Incomplete bladder emptying     Prostate troubles      Past Surgical History:   Procedure Laterality Date    CYSTOSCOPY      PROSTATE SURGERY      biopsy    TONSILLECTOMY      VASECTOMY       Social History     Socioeconomic History    Marital status:      Spouse name: None    Number of children: None    Years of education: None    Highest education level: None   Tobacco Use    Smoking status: Former     Current packs/day: 0.00     Types: Cigarettes     Quit date: 2017     Years since quittin.8    Smokeless tobacco: Never   Substance and Sexual Activity    Alcohol use: Yes     Alcohol/week: 2.0 standard drinks of alcohol     Types: 2 Cans of beer per week     Comment: a year    Drug use: No     Social Determinants of Health     Financial Resource Strain: Low Risk  (2020)    Received from [facility]    Overall Financial Resource Strain (CARDIA)     Difficulty of Paying Living Expenses: Not hard at all   Food Insecurity: No Food Insecurity (2020)    Received from [facility]    Hunger Vital Sign     Worried About Running Out of Food in the Last Year: Never true     Ran Out of Food in the Last Year: Never true   Transportation Needs: No Transportation Needs (2020)    Received from [facility]

## 2024-12-04 DIAGNOSIS — R97.20 ELEVATED PSA: ICD-10-CM

## 2024-12-04 LAB — PSA SERPL-MCNC: 6.05 NG/ML (ref 0–4)

## 2024-12-11 ENCOUNTER — OFFICE VISIT (OUTPATIENT)
Dept: UROLOGY | Age: 59
End: 2024-12-11
Payer: COMMERCIAL

## 2024-12-11 VITALS
HEIGHT: 70 IN | SYSTOLIC BLOOD PRESSURE: 100 MMHG | WEIGHT: 200 LBS | BODY MASS INDEX: 28.63 KG/M2 | DIASTOLIC BLOOD PRESSURE: 68 MMHG | HEART RATE: 58 BPM

## 2024-12-11 DIAGNOSIS — R97.20 ELEVATED PSA: Primary | ICD-10-CM

## 2024-12-11 LAB
BILIRUBIN, POC: ABNORMAL
BLOOD URINE, POC: ABNORMAL
CLARITY, POC: CLEAR
COLOR, POC: YELLOW
GLUCOSE URINE, POC: ABNORMAL MG/DL
KETONES, POC: ABNORMAL MG/DL
LEUKOCYTE EST, POC: ABNORMAL
NITRITE, POC: ABNORMAL
PH, POC: 6.5
PROTEIN, POC: ABNORMAL MG/DL
SPECIFIC GRAVITY, POC: 1.02
UROBILINOGEN, POC: 0.2 MG/DL

## 2024-12-11 PROCEDURE — 99213 OFFICE O/P EST LOW 20 MIN: CPT | Performed by: UROLOGY

## 2024-12-11 PROCEDURE — 3017F COLORECTAL CA SCREEN DOC REV: CPT | Performed by: UROLOGY

## 2024-12-11 PROCEDURE — 1036F TOBACCO NON-USER: CPT | Performed by: UROLOGY

## 2024-12-11 PROCEDURE — G8484 FLU IMMUNIZE NO ADMIN: HCPCS | Performed by: UROLOGY

## 2024-12-11 PROCEDURE — G8419 CALC BMI OUT NRM PARAM NOF/U: HCPCS | Performed by: UROLOGY

## 2024-12-11 PROCEDURE — 81003 URINALYSIS AUTO W/O SCOPE: CPT | Performed by: UROLOGY

## 2024-12-11 PROCEDURE — G8427 DOCREV CUR MEDS BY ELIG CLIN: HCPCS | Performed by: UROLOGY

## 2024-12-11 ASSESSMENT — ENCOUNTER SYMPTOMS: ABDOMINAL PAIN: 0

## 2024-12-11 NOTE — PROGRESS NOTES
Physical Exam  Abdominal:      General: There is no distension.   Neurological:      Mental Status: He is alert.   Psychiatric:         Mood and Affect: Mood normal.            PSA   Date Value Ref Range Status   12/04/2024 6.05 (H) 0.00 - 4.00 ng/mL Final     Comment:     This Roche electrochemiluminescent immunoassay is performed  on the Azul e immunoassay analyzer. Results obtained  with different test methods or kits must not be used  interchangeably.     11/07/2024 7.38 (H) 0.00 - 4.00 ng/mL Final     Comment:     This Roche electrochemiluminescent immunoassay is performed  on the Azul e immunoassay analyzer. Results obtained  with different test methods or kits must not be used  interchangeably.     08/18/2023 2.75 0.00 - 4.00 ng/mL Final   01/17/2023 3.29 0.00 - 4.00 ng/mL Final   07/07/2022 3.26 0.00 - 4.00 ng/mL Final       Assessment:      This is a 60 yo white male with h/o UTI, irregular LATOSHA/elevated PSA, BPH w/LUTs on alfuzosin (stable) with an unexplained PSA rise in recent PSA while he was having \"kidney problems\" and the PSA has improved. I discussed prostate MRI and possble fusion biopsy but for now he just wants to continue with closer PSA observation and will consider MRI if PSA does not continue to improve. The U/A shows no UTI today.        Plan:      F/U 6 weeks for PSA (pt choice)        Parviz Pritchett MD

## 2024-12-24 ENCOUNTER — HOSPITAL ENCOUNTER (EMERGENCY)
Facility: HOSPITAL | Age: 59
Discharge: HOME | End: 2024-12-24
Payer: COMMERCIAL

## 2024-12-24 ENCOUNTER — APPOINTMENT (OUTPATIENT)
Dept: RADIOLOGY | Facility: HOSPITAL | Age: 59
End: 2024-12-24
Payer: COMMERCIAL

## 2024-12-24 VITALS
OXYGEN SATURATION: 100 % | WEIGHT: 195 LBS | TEMPERATURE: 97 F | HEIGHT: 70 IN | BODY MASS INDEX: 27.92 KG/M2 | DIASTOLIC BLOOD PRESSURE: 85 MMHG | SYSTOLIC BLOOD PRESSURE: 178 MMHG | HEART RATE: 68 BPM | RESPIRATION RATE: 18 BRPM

## 2024-12-24 DIAGNOSIS — N20.1 RIGHT URETERAL CALCULUS: Primary | ICD-10-CM

## 2024-12-24 LAB
ALBUMIN SERPL BCP-MCNC: 4.3 G/DL (ref 3.4–5)
ALP SERPL-CCNC: 50 U/L (ref 33–120)
ALT SERPL W P-5'-P-CCNC: 40 U/L (ref 10–52)
ANION GAP SERPL CALC-SCNC: 11 MMOL/L (ref 10–20)
APPEARANCE UR: CLEAR
AST SERPL W P-5'-P-CCNC: 28 U/L (ref 9–39)
BASOPHILS # BLD AUTO: 0.06 X10*3/UL (ref 0–0.1)
BASOPHILS NFR BLD AUTO: 0.9 %
BILIRUB SERPL-MCNC: 0.5 MG/DL (ref 0–1.2)
BILIRUB UR STRIP.AUTO-MCNC: NEGATIVE MG/DL
BUN SERPL-MCNC: 18 MG/DL (ref 6–23)
CALCIUM SERPL-MCNC: 8.9 MG/DL (ref 8.6–10.3)
CHLORIDE SERPL-SCNC: 108 MMOL/L (ref 98–107)
CO2 SERPL-SCNC: 25 MMOL/L (ref 21–32)
COLOR UR: NORMAL
CREAT SERPL-MCNC: 1.3 MG/DL (ref 0.5–1.3)
EGFRCR SERPLBLD CKD-EPI 2021: 63 ML/MIN/1.73M*2
EOSINOPHIL # BLD AUTO: 0.14 X10*3/UL (ref 0–0.7)
EOSINOPHIL NFR BLD AUTO: 2 %
ERYTHROCYTE [DISTWIDTH] IN BLOOD BY AUTOMATED COUNT: 12.8 % (ref 11.5–14.5)
GLUCOSE SERPL-MCNC: 132 MG/DL (ref 74–99)
GLUCOSE UR STRIP.AUTO-MCNC: NORMAL MG/DL
HCT VFR BLD AUTO: 44.9 % (ref 41–52)
HGB BLD-MCNC: 15.3 G/DL (ref 13.5–17.5)
HOLD SPECIMEN: NORMAL
IMM GRANULOCYTES # BLD AUTO: 0.03 X10*3/UL (ref 0–0.7)
IMM GRANULOCYTES NFR BLD AUTO: 0.4 % (ref 0–0.9)
KETONES UR STRIP.AUTO-MCNC: NEGATIVE MG/DL
LEUKOCYTE ESTERASE UR QL STRIP.AUTO: NEGATIVE
LIPASE SERPL-CCNC: 57 U/L (ref 9–82)
LYMPHOCYTES # BLD AUTO: 2.01 X10*3/UL (ref 1.2–4.8)
LYMPHOCYTES NFR BLD AUTO: 29 %
MCH RBC QN AUTO: 31.6 PG (ref 26–34)
MCHC RBC AUTO-ENTMCNC: 34.1 G/DL (ref 32–36)
MCV RBC AUTO: 93 FL (ref 80–100)
MONOCYTES # BLD AUTO: 0.41 X10*3/UL (ref 0.1–1)
MONOCYTES NFR BLD AUTO: 5.9 %
NEUTROPHILS # BLD AUTO: 4.29 X10*3/UL (ref 1.2–7.7)
NEUTROPHILS NFR BLD AUTO: 61.8 %
NITRITE UR QL STRIP.AUTO: NEGATIVE
NRBC BLD-RTO: 0 /100 WBCS (ref 0–0)
PH UR STRIP.AUTO: 5.5 [PH]
PLATELET # BLD AUTO: 219 X10*3/UL (ref 150–450)
POTASSIUM SERPL-SCNC: 4.6 MMOL/L (ref 3.5–5.3)
PROT SERPL-MCNC: 7 G/DL (ref 6.4–8.2)
PROT UR STRIP.AUTO-MCNC: NEGATIVE MG/DL
RBC # BLD AUTO: 4.84 X10*6/UL (ref 4.5–5.9)
RBC # UR STRIP.AUTO: NEGATIVE /UL
SODIUM SERPL-SCNC: 139 MMOL/L (ref 136–145)
SP GR UR STRIP.AUTO: 1.02
UROBILINOGEN UR STRIP.AUTO-MCNC: NORMAL MG/DL
WBC # BLD AUTO: 6.9 X10*3/UL (ref 4.4–11.3)

## 2024-12-24 PROCEDURE — 83690 ASSAY OF LIPASE: CPT | Performed by: PHYSICIAN ASSISTANT

## 2024-12-24 PROCEDURE — 81003 URINALYSIS AUTO W/O SCOPE: CPT | Performed by: PHYSICIAN ASSISTANT

## 2024-12-24 PROCEDURE — 99284 EMERGENCY DEPT VISIT MOD MDM: CPT | Mod: 25

## 2024-12-24 PROCEDURE — 74176 CT ABD & PELVIS W/O CONTRAST: CPT | Performed by: STUDENT IN AN ORGANIZED HEALTH CARE EDUCATION/TRAINING PROGRAM

## 2024-12-24 PROCEDURE — 36415 COLL VENOUS BLD VENIPUNCTURE: CPT | Performed by: PHYSICIAN ASSISTANT

## 2024-12-24 PROCEDURE — 80053 COMPREHEN METABOLIC PANEL: CPT | Performed by: PHYSICIAN ASSISTANT

## 2024-12-24 PROCEDURE — 96375 TX/PRO/DX INJ NEW DRUG ADDON: CPT | Performed by: PHYSICIAN ASSISTANT

## 2024-12-24 PROCEDURE — 2500000004 HC RX 250 GENERAL PHARMACY W/ HCPCS (ALT 636 FOR OP/ED): Performed by: PHYSICIAN ASSISTANT

## 2024-12-24 PROCEDURE — 85025 COMPLETE CBC W/AUTO DIFF WBC: CPT | Performed by: PHYSICIAN ASSISTANT

## 2024-12-24 PROCEDURE — 96374 THER/PROPH/DIAG INJ IV PUSH: CPT | Performed by: PHYSICIAN ASSISTANT

## 2024-12-24 PROCEDURE — 74176 CT ABD & PELVIS W/O CONTRAST: CPT

## 2024-12-24 RX ORDER — KETOROLAC TROMETHAMINE 30 MG/ML
30 INJECTION, SOLUTION INTRAMUSCULAR; INTRAVENOUS ONCE
Status: COMPLETED | OUTPATIENT
Start: 2024-12-24 | End: 2024-12-24

## 2024-12-24 RX ORDER — OXYCODONE AND ACETAMINOPHEN 5; 325 MG/1; MG/1
1 TABLET ORAL EVERY 8 HOURS PRN
Qty: 9 TABLET | Refills: 0 | Status: SHIPPED | OUTPATIENT
Start: 2024-12-24 | End: 2024-12-27

## 2024-12-24 RX ORDER — ONDANSETRON HYDROCHLORIDE 2 MG/ML
4 INJECTION, SOLUTION INTRAVENOUS ONCE
Status: COMPLETED | OUTPATIENT
Start: 2024-12-24 | End: 2024-12-24

## 2024-12-24 RX ORDER — MORPHINE SULFATE 4 MG/ML
4 INJECTION, SOLUTION INTRAMUSCULAR; INTRAVENOUS ONCE
Status: COMPLETED | OUTPATIENT
Start: 2024-12-24 | End: 2024-12-24

## 2024-12-24 ASSESSMENT — LIFESTYLE VARIABLES
EVER FELT BAD OR GUILTY ABOUT YOUR DRINKING: NO
HAVE PEOPLE ANNOYED YOU BY CRITICIZING YOUR DRINKING: NO
HAVE YOU EVER FELT YOU SHOULD CUT DOWN ON YOUR DRINKING: NO
EVER HAD A DRINK FIRST THING IN THE MORNING TO STEADY YOUR NERVES TO GET RID OF A HANGOVER: NO
TOTAL SCORE: 0

## 2024-12-24 ASSESSMENT — PAIN SCALES - GENERAL
PAINLEVEL_OUTOF10: 7
PAINLEVEL_OUTOF10: 6

## 2024-12-24 ASSESSMENT — PAIN DESCRIPTION - ORIENTATION: ORIENTATION: RIGHT

## 2024-12-24 ASSESSMENT — PAIN - FUNCTIONAL ASSESSMENT: PAIN_FUNCTIONAL_ASSESSMENT: 0-10

## 2024-12-24 ASSESSMENT — COLUMBIA-SUICIDE SEVERITY RATING SCALE - C-SSRS
2. HAVE YOU ACTUALLY HAD ANY THOUGHTS OF KILLING YOURSELF?: NO
1. IN THE PAST MONTH, HAVE YOU WISHED YOU WERE DEAD OR WISHED YOU COULD GO TO SLEEP AND NOT WAKE UP?: NO
6. HAVE YOU EVER DONE ANYTHING, STARTED TO DO ANYTHING, OR PREPARED TO DO ANYTHING TO END YOUR LIFE?: NO

## 2024-12-24 ASSESSMENT — PAIN DESCRIPTION - FREQUENCY: FREQUENCY: CONSTANT/CONTINUOUS

## 2024-12-24 NOTE — ED PROVIDER NOTES
HPI   Chief Complaint   Patient presents with    Flank Pain     Right flank pain       59-year-old male history of enlarged prostate presenting to the ER today with pain in the right side of his back wrapping into his flank and abdomen that started 2 hours ago without any fall or injury.  Patient states he also feels that he needs to keep urinating but there is only a little bit coming out at a time.  He feels like he is not having difficulty emptying his bladder just feels like he needs to keep going and there is some burning with urination.  He has not seen blood in the urine.  Pain is not making him nauseous and he has not vomited.  He has had regular bowel movements.  He has not had a fever.  He denies any previous history of kidney stones and states that he does not really have any other medical problems except in a large prostate.  No further complaints this time and he did not take any pain medicine before coming to the ED.      History provided by:  Patient          Patient History   No past medical history on file.  No past surgical history on file.  No family history on file.  Social History     Tobacco Use    Smoking status: Not on file    Smokeless tobacco: Not on file   Substance Use Topics    Alcohol use: Not on file    Drug use: Not on file       Physical Exam   ED Triage Vitals [12/24/24 0230]   Temperature Heart Rate Respirations BP   36.1 °C (97 °F) 68 18 178/85      Pulse Ox Temp Source Heart Rate Source Patient Position   100 % Axillary Monitor Sitting      BP Location FiO2 (%)     Right arm --       Physical Exam  Eyes:      Conjunctiva/sclera: Conjunctivae normal.   Cardiovascular:      Rate and Rhythm: Normal rate and regular rhythm.      Pulses: Normal pulses.      Heart sounds: Normal heart sounds.   Pulmonary:      Effort: Pulmonary effort is normal.      Breath sounds: Normal breath sounds.   Abdominal:      General: Bowel sounds are normal. There is no distension.      Palpations: Abdomen  is soft.      Tenderness: There is abdominal tenderness. There is right CVA tenderness. There is no left CVA tenderness, guarding or rebound.      Comments: Tenderness in the right flank, right mid abdomen without guarding or rebound.  No signs of trauma or infection.  Abdomen soft nondistended with normal bowel sounds.  No further tenderness on palpation.   Musculoskeletal:      Comments: Normal gait and strength tone.  No lumbar spinal tenderness, step-offs or signs of trauma.   Skin:     General: Skin is warm.   Neurological:      Mental Status: He is alert.           ED Course & MDM   Diagnoses as of 12/24/24 0343   Right ureteral calculus                 No data recorded     [place] Coma Scale Score: 15 (12/24/24 0231 : Coleman Rojas RN)                           Medical Decision Making  59-year-old male with a history of enlarged prostate presented to the ER today with right back pain, right flank pain and urinary frequency that started 2 hours prior to arrival.  He has not taken any medicine for symptom relief.  No further associated complaints and he arrives afebrile, hypertensive with otherwise stable vital signs.  Patient does appear slightly uncomfortable on exam.  Heart RRR, lungs are clear and there is right CVA tenderness as well as tenderness in the right flank and the right mid abdomen without guarding or rebound.  Abdomen soft nondistended with normal bowel sounds.  There is no further tenderness on palpation.  Laboratory studies, urinalysis and CT are ordered as well as pain medication.    CBC with stable H&H and no leukocytosis.  Urinalysis without evidence of infection or hematuria.  CMP with blood glucose of 132 but no other acute electrolyte abnormality.  Stable renal function and labs this time are otherwise within normal limits.  CT performed today does show a right UVJ stone of 6 mm with mild right hydroureter.  There are additional bilateral nonobstructing renal calculi.  On  reassessment patient states the morphine did not help his pain so I did order a dose of Toradol and we will reassess him.    After Toradol patient feels improved and is resting comfortably.  I did discuss his results with him today and treatment plan home-going.  He already follows up with a urologist due to his history of enlarged prostate and he is on alfuzosin already.  Did discuss with the patient that we will discharge him home at this time with pain medication he will need close follow-up with urology.  Patient agreed with this plan.  I did review his [facility] report which was without any recent Rx or active MME.  He will be discharged home with pain medication but I did also discussed warning signs to return to the ER and he expressed understanding and agreed with the plan of care today.      Labs Reviewed   COMPREHENSIVE METABOLIC PANEL - Abnormal       Result Value    Glucose 132 (*)     Sodium 139      Potassium 4.6      Chloride 108 (*)     Bicarbonate 25      Anion Gap 11      Urea Nitrogen 18      Creatinine 1.30      eGFR 63      Calcium 8.9      Albumin 4.3      Alkaline Phosphatase 50      Total Protein 7.0      AST 28      Bilirubin, Total 0.5      ALT 40     URINALYSIS WITH REFLEX CULTURE AND MICROSCOPIC - Normal    Color, Urine Light-Yellow      Appearance, Urine Clear      Specific Gravity, Urine 1.022      pH, Urine 5.5      Protein, Urine NEGATIVE      Glucose, Urine Normal      Blood, Urine NEGATIVE      Ketones, Urine NEGATIVE      Bilirubin, Urine NEGATIVE      Urobilinogen, Urine Normal      Nitrite, Urine NEGATIVE      Leukocyte Esterase, Urine NEGATIVE     LIPASE - Normal    Lipase 57      Narrative:     Venipuncture immediately after or during the administration of Metamizole may lead to falsely low results. Testing should be performed immediately prior to Metamizole dosing.   CBC WITH AUTO DIFFERENTIAL    WBC 6.9      nRBC 0.0      RBC 4.84      Hemoglobin 15.3      Hematocrit 44.9       MCV 93      MCH 31.6      MCHC 34.1      RDW 12.8      Platelets 219      Neutrophils % 61.8      Immature Granulocytes %, Automated 0.4      Lymphocytes % 29.0      Monocytes % 5.9      Eosinophils % 2.0      Basophils % 0.9      Neutrophils Absolute 4.29      Immature Granulocytes Absolute, Automated 0.03      Lymphocytes Absolute 2.01      Monocytes Absolute 0.41      Eosinophils Absolute 0.14      Basophils Absolute 0.06     URINALYSIS WITH REFLEX CULTURE AND MICROSCOPIC    Narrative:     The following orders were created for panel order Urinalysis with Reflex Culture and Microscopic.  Procedure                               Abnormality         Status                     ---------                               -----------         ------                     Urinalysis with Reflex C...[[id number]]  Normal              Final result               Extra Urine Gray Tube[[id number]]                            In process                   Please view results for these tests on the individual orders.   EXTRA URINE GRAY TUBE       CT abdomen pelvis wo IV contrast   Final Result   1. Right ureterovesical junction stone measuring 0.6 cm resulting in   mild right hydroureter and mild edematous changes of the right kidney.   2. Circumferential bladder wall thickening is nonspecific and may be   related to underdistention or cystitis, correlate with urinalysis.   3. Additional bilateral nonobstructive renal calculi.        MACRO:   None        Signed by: Adi Liu 12/24/2024 3:38 AM   Dictation workstation:   [id number]            Procedure  Procedures     Barb Best PA-C  12/24/24 0427

## 2025-01-17 DIAGNOSIS — R97.20 ELEVATED PSA: ICD-10-CM

## 2025-01-17 LAB — PSA SERPL-MCNC: 5.65 NG/ML (ref 0–4)

## 2025-01-24 ENCOUNTER — OFFICE VISIT (OUTPATIENT)
Dept: UROLOGY | Age: 60
End: 2025-01-24
Payer: COMMERCIAL

## 2025-01-24 VITALS
SYSTOLIC BLOOD PRESSURE: 122 MMHG | DIASTOLIC BLOOD PRESSURE: 70 MMHG | WEIGHT: 200 LBS | BODY MASS INDEX: 28.63 KG/M2 | HEIGHT: 70 IN | HEART RATE: 55 BPM

## 2025-01-24 DIAGNOSIS — N20.0 KIDNEY STONE: Primary | ICD-10-CM

## 2025-01-24 DIAGNOSIS — R97.20 ELEVATED PSA: ICD-10-CM

## 2025-01-24 LAB
BILIRUBIN, POC: NORMAL
BLOOD URINE, POC: NORMAL
CLARITY, POC: CLEAR
COLOR, POC: YELLOW
GLUCOSE URINE, POC: NORMAL MG/DL
KETONES, POC: NORMAL MG/DL
LEUKOCYTE EST, POC: NORMAL
NITRITE, POC: NORMAL
PH, POC: 7
PROTEIN, POC: NORMAL MG/DL
SPECIFIC GRAVITY, POC: 1.02
UROBILINOGEN, POC: 0.2 MG/DL

## 2025-01-24 PROCEDURE — G8419 CALC BMI OUT NRM PARAM NOF/U: HCPCS | Performed by: UROLOGY

## 2025-01-24 PROCEDURE — 1036F TOBACCO NON-USER: CPT | Performed by: UROLOGY

## 2025-01-24 PROCEDURE — 99214 OFFICE O/P EST MOD 30 MIN: CPT | Performed by: UROLOGY

## 2025-01-24 PROCEDURE — 81003 URINALYSIS AUTO W/O SCOPE: CPT | Performed by: UROLOGY

## 2025-01-24 PROCEDURE — G8427 DOCREV CUR MEDS BY ELIG CLIN: HCPCS | Performed by: UROLOGY

## 2025-01-24 PROCEDURE — 3017F COLORECTAL CA SCREEN DOC REV: CPT | Performed by: UROLOGY

## 2025-01-24 ASSESSMENT — ENCOUNTER SYMPTOMS
ABDOMINAL PAIN: 0
ABDOMINAL DISTENTION: 0
BACK PAIN: 1

## 2025-01-24 NOTE — PROGRESS NOTES
Subjective:      Patient ID: Charles Jackson is a 59 y.o. male    HPI  This is a 58 yo white male with h/o prior UTI, irregular LATOSHA/elevated PSA, BPH w/LUTs on alfuzosin back in follow-up. Since last seen on 24 for an elevated PSA, he was seen at [facility] for a 6 mm Rt distal ureteral stone. He did not see the stone pass but has no further significant symptoms. He has no colic, no hematuria or N/V or F/C. He gets occasional lumbar back pain. He had a repeat PSA and this was reviewed. He has no other complaints. I reviewed the [facility] records.         Trus/Prostate biopsy 10/12: neg  Cystoscopy for microhematuria 10/12: mild bulbous stricture, neg otherwise  Cytol/FISH : neg    Past Medical History:   Diagnosis Date    Hyperlipidemia     Incomplete bladder emptying     Prostate troubles      Past Surgical History:   Procedure Laterality Date    CYSTOSCOPY      PROSTATE SURGERY      biopsy    TONSILLECTOMY      VASECTOMY  2012     Social History     Socioeconomic History    Marital status:    Tobacco Use    Smoking status: Former     Current packs/day: 0.00     Types: Cigarettes     Quit date: 2017     Years since quittin.0    Smokeless tobacco: Never   Substance and Sexual Activity    Alcohol use: Yes     Alcohol/week: 2.0 standard drinks of alcohol     Types: 2 Cans of beer per week     Comment: a year    Drug use: No     Social Determinants of Health     Financial Resource Strain: Low Risk  (2025)    Received from [facility]    Overall Financial Resource Strain (CARDIA)     Difficulty of Paying Living Expenses: Not hard at all   Food Insecurity: No Food Insecurity (2025)    Received from [facility]    Hunger Vital Sign     Worried About Running Out of Food in the Last Year: Never true     Ran Out of Food in the Last Year: Never true   Transportation Needs: No Transportation Needs (2025)    Received from [facility]    PRAPARE - Transportation     Lack of Transportation

## 2025-02-19 ENCOUNTER — HOSPITAL ENCOUNTER (OUTPATIENT)
Dept: MRI IMAGING | Age: 60
Discharge: HOME | End: 2025-02-21
Attending: UROLOGY
Payer: COMMERCIAL

## 2025-02-19 DIAGNOSIS — R97.20 ELEVATED PSA: ICD-10-CM

## 2025-02-19 PROCEDURE — 72197 MRI PELVIS W/O & W/DYE: CPT | Performed by: RADIOLOGY

## 2025-02-19 PROCEDURE — A9577 INJ MULTIHANCE: HCPCS | Performed by: UROLOGY

## 2025-02-19 PROCEDURE — 72197 MRI PELVIS W/O & W/DYE: CPT

## 2025-02-19 PROCEDURE — 6360000004 HC RX CONTRAST MEDICATION: Performed by: UROLOGY

## 2025-02-19 RX ADMIN — GADOBENATE DIMEGLUMINE 20 ML: 529 INJECTION, SOLUTION INTRAVENOUS at 18:33

## 2025-02-26 ENCOUNTER — OFFICE VISIT (OUTPATIENT)
Dept: UROLOGY | Age: 60
End: 2025-02-26
Payer: COMMERCIAL

## 2025-02-26 VITALS
HEART RATE: 71 BPM | BODY MASS INDEX: 27.92 KG/M2 | WEIGHT: 195 LBS | SYSTOLIC BLOOD PRESSURE: 120 MMHG | DIASTOLIC BLOOD PRESSURE: 70 MMHG | HEIGHT: 70 IN

## 2025-02-26 DIAGNOSIS — R93.89 ABNORMAL MRI: Primary | ICD-10-CM

## 2025-02-26 DIAGNOSIS — R97.20 ELEVATED PSA: Primary | ICD-10-CM

## 2025-02-26 DIAGNOSIS — R97.20 ELEVATED PSA: ICD-10-CM

## 2025-02-26 PROCEDURE — 99214 OFFICE O/P EST MOD 30 MIN: CPT | Performed by: UROLOGY

## 2025-02-26 PROCEDURE — 3017F COLORECTAL CA SCREEN DOC REV: CPT | Performed by: UROLOGY

## 2025-02-26 PROCEDURE — G8419 CALC BMI OUT NRM PARAM NOF/U: HCPCS | Performed by: UROLOGY

## 2025-02-26 PROCEDURE — 1036F TOBACCO NON-USER: CPT | Performed by: UROLOGY

## 2025-02-26 PROCEDURE — G8427 DOCREV CUR MEDS BY ELIG CLIN: HCPCS | Performed by: UROLOGY

## 2025-02-26 RX ORDER — CIPROFLOXACIN 500 MG/1
500 TABLET, FILM COATED ORAL 2 TIMES DAILY
Qty: 4 TABLET | Refills: 0 | Status: SHIPPED | OUTPATIENT
Start: 2025-02-26

## 2025-02-26 RX ORDER — ONDANSETRON 4 MG/1
4 TABLET, FILM COATED ORAL 3 TIMES DAILY PRN
Qty: 15 TABLET | Refills: 0 | Status: SHIPPED | OUTPATIENT
Start: 2025-02-26

## 2025-02-26 RX ORDER — CLOTRIMAZOLE AND BETAMETHASONE DIPROPIONATE 10; .64 MG/G; MG/G
CREAM TOPICAL 2 TIMES DAILY
COMMUNITY
Start: 2025-02-13

## 2025-02-26 ASSESSMENT — ENCOUNTER SYMPTOMS: ABDOMINAL PAIN: 0

## 2025-02-26 NOTE — PROGRESS NOTES
Subjective:      Patient ID: Charles Jackson is a 60 y.o. male    HPI   This is a 59 yo white male with h/o prior UTI, irregular LATOSHA/elevated PSA, BPH w/LUTs on alfuzosin back in follow-up. Since last seen on 25 for an elevated PSA, he had a prostate MRI and is back in follow-up. He has no interval  complaints. He gets nausea after taking Cipro for a few days. He has no true allergy (no rash or swelling).         Trus/Prostate biopsy 10/12: neg  Cystoscopy for microhematuria 10/12: mild bulbous stricture, neg otherwise  Cytol/FISH : neg    Past Medical History:   Diagnosis Date    Hyperlipidemia     Incomplete bladder emptying     Prostate troubles      Past Surgical History:   Procedure Laterality Date    CYSTOSCOPY      PROSTATE SURGERY      biopsy    TONSILLECTOMY      VASECTOMY       Social History     Socioeconomic History    Marital status:      Spouse name: None    Number of children: None    Years of education: None    Highest education level: None   Tobacco Use    Smoking status: Former     Current packs/day: 0.00     Types: Cigarettes     Quit date: 2017     Years since quittin.1    Smokeless tobacco: Never   Substance and Sexual Activity    Alcohol use: Yes     Alcohol/week: 2.0 standard drinks of alcohol     Types: 2 Cans of beer per week     Comment: a year    Drug use: No     Social Determinants of Health     Financial Resource Strain: Low Risk  (2025)    Received from [facility]    Overall Financial Resource Strain (CARDIA)     Difficulty of Paying Living Expenses: Not hard at all   Food Insecurity: No Food Insecurity (2025)    Received from [facility]    Hunger Vital Sign     Worried About Running Out of Food in the Last Year: Never true     Ran Out of Food in the Last Year: Never true   Transportation Needs: No Transportation Needs (2025)    Received from [facility]    PRAPARE - Transportation     Lack of Transportation (Medical): No

## 2025-03-14 NOTE — PROGRESS NOTES
VASCULAR MEDICINE AND INTERVENTIONAL RADIOLOGY DEPARTMENT:     Chief Complaint   Patient presents with    New Patient     Consult- Prostate Bx     HPI 3/17/25: Charles Jackson, a male of 60 y.o. came to the office 3/17/2025, referred by Dr. Pritchett, for prostate biopsy.  Patient following with urology for history of elevated PSA.  Had MRI prostate on 2/21/25 revealing category 4 lesion in the left posterolateral peripheral zone, category 4 lesion in right transition zone, and category 3 lesion in left anterior transition zone with recommendation for biopsy.  Patient endorses history of prior prostate biopsy.  Per patient, prostate biopsy was in 2006.  Per medical chart review prostate biopsy was in 2012.  Patient states biopsy was negative for  malignancy.  Denies blood or bleeding disorders. Denies cardiac conditions. Denies liver conditions. Denies pulmonary disorders.  Denies history of MI or stroke.  Denies taking anticoagulation or blood thinning medications.  Denies taking aspirin.  Does take ibuprofen OTC as needed for pain.  Does take vitamins.  He is a former smoker, states he quit in 2007.  He drinks approximately 1 time a month.  He denies drug use.  Patient states he experiences nausea with ciprofloxacin. Denies allergic symptoms such as rash or swelling. Denies other allergies.  Denies chest pain. Denies dyspnea.     Medical history: BPH, hyperlipidemia, prostate biopsy in 2012    Family History   Problem Relation Age of Onset    Cancer Mother         bladder    Arthritis Mother     High Blood Pressure Mother     Heart Attack Father     Alzheimer's Disease Father     Stroke Father     High Blood Pressure Father     Heart Disease Father        Past Surgical History:   Procedure Laterality Date    CYSTOSCOPY      PROSTATE SURGERY      biopsy    TONSILLECTOMY      VASECTOMY  2012        Past Medical History:   Diagnosis Date    Hyperlipidemia     Incomplete bladder emptying     Prostate troubles

## 2025-03-17 ENCOUNTER — OFFICE VISIT (OUTPATIENT)
Dept: INTERVENTIONAL RADIOLOGY/VASCULAR | Age: 60
End: 2025-03-17
Payer: COMMERCIAL

## 2025-03-17 ENCOUNTER — TELEPHONE (OUTPATIENT)
Dept: INTERVENTIONAL RADIOLOGY/VASCULAR | Age: 60
End: 2025-03-17

## 2025-03-17 VITALS
DIASTOLIC BLOOD PRESSURE: 83 MMHG | WEIGHT: 195 LBS | HEART RATE: 60 BPM | SYSTOLIC BLOOD PRESSURE: 133 MMHG | HEIGHT: 70 IN | BODY MASS INDEX: 27.92 KG/M2 | OXYGEN SATURATION: 99 %

## 2025-03-17 DIAGNOSIS — R97.20 ELEVATED PSA: Primary | ICD-10-CM

## 2025-03-17 PROCEDURE — 1036F TOBACCO NON-USER: CPT

## 2025-03-17 PROCEDURE — G8427 DOCREV CUR MEDS BY ELIG CLIN: HCPCS

## 2025-03-17 PROCEDURE — 99204 OFFICE O/P NEW MOD 45 MIN: CPT

## 2025-03-17 PROCEDURE — G8419 CALC BMI OUT NRM PARAM NOF/U: HCPCS

## 2025-03-17 PROCEDURE — 3017F COLORECTAL CA SCREEN DOC REV: CPT

## 2025-03-17 RX ORDER — FLUTICASONE PROPIONATE 50 MCG
SPRAY, SUSPENSION (ML) NASAL
COMMUNITY
Start: 2025-03-08

## 2025-03-17 NOTE — TELEPHONE ENCOUNTER
Patient was given this information prior to leaving the office - voiced understanding  2.  Spoke to Venecia RN in Specials to schedule procedure    >  Prostate biopsy is scheduled on 4/3/2025 @ 10:30 am  >  You will need to arrive at 9:00 am from home and check in at the Diagnostic Imaging Check In desk.   >  Do not eat or drink after midnight.    >  Day before the procedure - eat a light breakfast and then only clear liquids the rest of the day   >  Patient will make arrangements for transportation by Malina  >  Patient to hold Advil/Motrin/Ibuprofen and Vitamins for 14 days prior to procedure - starting 3/20/2025  >  Patient to have PT/INR, CBC and BMP drawn 2-4 days prior to procedure  >  Bowl Prep: Mix 8.3 ounces (238 g) of MiraLAX in 64 ounces of Gatorade.  The day before biopsy by 11:00 am, drink half of the mixture (32 ounces), then by 7:00 pm that evening, dring the remainder (32 ounces) of the mixture. The day of the biopsy, administer over-the-counter Fleets enema 2-3 hours prior to arrival to the hospital  > Antibiotics:  Take antibiotics as instructed on the bottle - if you have any questions, call Dr. Pritchett' office  >  Dr. Pritchett prescribed Zofran due to nausea with Cipro

## 2025-03-31 DIAGNOSIS — R97.20 ELEVATED PSA: ICD-10-CM

## 2025-03-31 LAB
ANION GAP SERPL CALCULATED.3IONS-SCNC: 10 MEQ/L (ref 9–15)
BUN SERPL-MCNC: 11 MG/DL (ref 8–23)
CALCIUM SERPL-MCNC: 9.2 MG/DL (ref 8.5–9.9)
CHLORIDE SERPL-SCNC: 107 MEQ/L (ref 95–107)
CO2 SERPL-SCNC: 25 MEQ/L (ref 20–31)
CREAT SERPL-MCNC: 0.93 MG/DL (ref 0.7–1.2)
ERYTHROCYTE [DISTWIDTH] IN BLOOD BY AUTOMATED COUNT: 12.8 % (ref 11.5–14.5)
GLUCOSE SERPL-MCNC: 79 MG/DL (ref 70–99)
HCT VFR BLD AUTO: 39.2 % (ref 42–52)
HGB BLD-MCNC: 13.6 G/DL (ref 14–18)
INR PPP: 1
MCH RBC QN AUTO: 31.3 PG (ref 27–31.3)
MCHC RBC AUTO-ENTMCNC: 34.7 % (ref 33–37)
MCV RBC AUTO: 90.1 FL (ref 79–92.2)
PLATELET # BLD AUTO: 217 K/UL (ref 130–400)
POTASSIUM SERPL-SCNC: 4 MEQ/L (ref 3.4–4.9)
PROTHROMBIN TIME: 13.3 SEC (ref 12.3–14.9)
RBC # BLD AUTO: 4.35 M/UL (ref 4.7–6.1)
SODIUM SERPL-SCNC: 142 MEQ/L (ref 135–144)
WBC # BLD AUTO: 6.7 K/UL (ref 4.8–10.8)

## 2025-04-01 ENCOUNTER — PRE-PROCEDURE TELEPHONE (OUTPATIENT)
Dept: INTERVENTIONAL RADIOLOGY/VASCULAR | Age: 60
End: 2025-04-01

## 2025-04-01 NOTE — FLOWSHEET NOTE
Apt reminder called to patient, the following information reviewed:   Biopsy is scheduled on 4/3/2025 @ 10:30 am  >  You will need to arrive at 9:00 am from home and check in at the Diagnostic Imaging Check In desk.   >  Do not eat or drink after midnight.    >  Day before the procedure - eat a light breakfast and then only clear liquids the rest of the day   >  Patient will need transportation home d/t receiving sedation.  >  Patient to hold Advil/Motrin/Ibuprofen and Vitamins for 14 days prior to procedure - starting 3/20/2025  >  Bowl Prep: Mix 8.3 ounces (238 g) of MiraLAX in 64 ounces of Gatorade.  The day before biopsy by 11:00 am, drink half of the mixture (32 ounces), then by 7:00 pm that evening, dring the remainder (32 ounces) of the mixture. The day of the biopsy, administer over-the-counter Fleets enema 2-3 hours prior to arrival to the hospital  > Antibiotics:  Take antibiotics as instructed on the bottle.     Electronically signed by Aubrie Zarate RN on 4/1/2025 at 2:02 PM

## 2025-04-02 ENCOUNTER — PRE-PROCEDURE TELEPHONE (OUTPATIENT)
Dept: INTERVENTIONAL RADIOLOGY/VASCULAR | Age: 60
End: 2025-04-02

## 2025-04-02 NOTE — FLOWSHEET NOTE
Apt reminder called to patient. No answer, VM left.     Pt instructed to arrive at diagnostic img desk at 0900. Requested that pt please call back with any questions or concerns regarding previous pre-procedure instructions. Electronically signed by Aubrie Zarate RN on 4/2/2025 at 12:59 PM     Pt returned call and instructions reviewed with patient. Pt states he drank all of his prep by 1200, confirmed with Dr. Murray ok to to proceed and that is no issue .Electronically signed by Aubrie Zarate RN on 4/2/2025 at 1:08 PM

## 2025-04-03 ENCOUNTER — HOSPITAL ENCOUNTER (OUTPATIENT)
Dept: INTERVENTIONAL RADIOLOGY/VASCULAR | Age: 60
Discharge: HOME | End: 2025-04-05
Payer: COMMERCIAL

## 2025-04-03 ENCOUNTER — ANESTHESIA EVENT (OUTPATIENT)
Dept: INTERVENTIONAL RADIOLOGY/VASCULAR | Age: 60
End: 2025-04-03
Payer: COMMERCIAL

## 2025-04-03 ENCOUNTER — ANESTHESIA (OUTPATIENT)
Dept: INTERVENTIONAL RADIOLOGY/VASCULAR | Age: 60
End: 2025-04-03
Payer: COMMERCIAL

## 2025-04-03 VITALS
WEIGHT: 195 LBS | HEIGHT: 70 IN | DIASTOLIC BLOOD PRESSURE: 91 MMHG | BODY MASS INDEX: 27.92 KG/M2 | HEART RATE: 54 BPM | TEMPERATURE: 98.4 F | RESPIRATION RATE: 13 BRPM | OXYGEN SATURATION: 99 % | SYSTOLIC BLOOD PRESSURE: 149 MMHG

## 2025-04-03 DIAGNOSIS — R97.20 ELEVATED PSA: ICD-10-CM

## 2025-04-03 PROCEDURE — 6360000002 HC RX W HCPCS: Performed by: NURSE PRACTITIONER

## 2025-04-03 PROCEDURE — 7100000011 HC PHASE II RECOVERY - ADDTL 15 MIN

## 2025-04-03 PROCEDURE — 55706 BX PRST8 NDL SAT SAMPLING: CPT

## 2025-04-03 PROCEDURE — 6360000002 HC RX W HCPCS: Performed by: ANESTHESIOLOGIST ASSISTANT

## 2025-04-03 PROCEDURE — 2709999900 US BIOPSY PROSTATE NEEDLE/PUNCH

## 2025-04-03 PROCEDURE — 2580000003 HC RX 258: Performed by: ANESTHESIOLOGIST ASSISTANT

## 2025-04-03 PROCEDURE — 6360000002 HC RX W HCPCS: Performed by: RADIOLOGY

## 2025-04-03 PROCEDURE — 7100000010 HC PHASE II RECOVERY - FIRST 15 MIN

## 2025-04-03 PROCEDURE — 88342 IMHCHEM/IMCYTCHM 1ST ANTB: CPT

## 2025-04-03 PROCEDURE — 88305 TISSUE EXAM BY PATHOLOGIST: CPT

## 2025-04-03 PROCEDURE — 6370000000 HC RX 637 (ALT 250 FOR IP): Performed by: RADIOLOGY

## 2025-04-03 RX ORDER — MIDAZOLAM HYDROCHLORIDE 1 MG/ML
INJECTION, SOLUTION INTRAMUSCULAR; INTRAVENOUS
Status: DISCONTINUED | OUTPATIENT
Start: 2025-04-03 | End: 2025-04-03 | Stop reason: SDUPTHER

## 2025-04-03 RX ORDER — KETOROLAC TROMETHAMINE 30 MG/ML
INJECTION, SOLUTION INTRAMUSCULAR; INTRAVENOUS
Status: DISCONTINUED | OUTPATIENT
Start: 2025-04-03 | End: 2025-04-03 | Stop reason: SDUPTHER

## 2025-04-03 RX ORDER — LIDOCAINE HYDROCHLORIDE 20 MG/ML
INJECTION, SOLUTION INFILTRATION; PERINEURAL PRN
Status: COMPLETED | OUTPATIENT
Start: 2025-04-03 | End: 2025-04-03

## 2025-04-03 RX ORDER — FENTANYL CITRATE 50 UG/ML
INJECTION, SOLUTION INTRAMUSCULAR; INTRAVENOUS
Status: DISCONTINUED | OUTPATIENT
Start: 2025-04-03 | End: 2025-04-03 | Stop reason: SDUPTHER

## 2025-04-03 RX ORDER — METOCLOPRAMIDE HYDROCHLORIDE 5 MG/ML
10 INJECTION INTRAMUSCULAR; INTRAVENOUS
OUTPATIENT
Start: 2025-04-03

## 2025-04-03 RX ORDER — OXYCODONE HYDROCHLORIDE 5 MG/1
5 TABLET ORAL
Refills: 0 | OUTPATIENT
Start: 2025-04-03

## 2025-04-03 RX ORDER — SODIUM CHLORIDE 9 MG/ML
INJECTION, SOLUTION INTRAVENOUS PRN
OUTPATIENT
Start: 2025-04-03

## 2025-04-03 RX ORDER — SODIUM CHLORIDE 0.9 % (FLUSH) 0.9 %
5-40 SYRINGE (ML) INJECTION EVERY 12 HOURS SCHEDULED
OUTPATIENT
Start: 2025-04-03

## 2025-04-03 RX ORDER — SODIUM CHLORIDE 9 MG/ML
INJECTION, SOLUTION INTRAVENOUS
Status: COMPLETED
Start: 2025-04-03 | End: 2025-04-03

## 2025-04-03 RX ORDER — FENTANYL CITRATE 0.05 MG/ML
50 INJECTION, SOLUTION INTRAMUSCULAR; INTRAVENOUS EVERY 10 MIN PRN
Refills: 0 | OUTPATIENT
Start: 2025-04-03

## 2025-04-03 RX ORDER — ONDANSETRON 2 MG/ML
4 INJECTION INTRAMUSCULAR; INTRAVENOUS
OUTPATIENT
Start: 2025-04-03

## 2025-04-03 RX ORDER — NEOMYCIN/BACITRACIN/POLYMYXINB 3.5-400-5K
OINTMENT (GRAM) TOPICAL PRN
Status: COMPLETED | OUTPATIENT
Start: 2025-04-03 | End: 2025-04-03

## 2025-04-03 RX ORDER — PROPOFOL 10 MG/ML
INJECTION, EMULSION INTRAVENOUS
Status: DISCONTINUED | OUTPATIENT
Start: 2025-04-03 | End: 2025-04-03 | Stop reason: SDUPTHER

## 2025-04-03 RX ORDER — LIDOCAINE HYDROCHLORIDE 20 MG/ML
INJECTION, SOLUTION EPIDURAL; INFILTRATION; INTRACAUDAL; PERINEURAL
Status: DISCONTINUED | OUTPATIENT
Start: 2025-04-03 | End: 2025-04-03 | Stop reason: SDUPTHER

## 2025-04-03 RX ORDER — LEVOFLOXACIN 5 MG/ML
500 INJECTION, SOLUTION INTRAVENOUS ONCE
Status: COMPLETED | OUTPATIENT
Start: 2025-04-03 | End: 2025-04-03

## 2025-04-03 RX ORDER — DIPHENHYDRAMINE HYDROCHLORIDE 50 MG/ML
12.5 INJECTION, SOLUTION INTRAMUSCULAR; INTRAVENOUS
OUTPATIENT
Start: 2025-04-03

## 2025-04-03 RX ORDER — MEPERIDINE HYDROCHLORIDE 25 MG/ML
12.5 INJECTION INTRAMUSCULAR; INTRAVENOUS; SUBCUTANEOUS
Refills: 0 | OUTPATIENT
Start: 2025-04-03

## 2025-04-03 RX ORDER — SODIUM CHLORIDE 0.9 % (FLUSH) 0.9 %
5-40 SYRINGE (ML) INJECTION PRN
OUTPATIENT
Start: 2025-04-03

## 2025-04-03 RX ORDER — SODIUM CHLORIDE 9 MG/ML
INJECTION, SOLUTION INTRAVENOUS
Status: DISCONTINUED | OUTPATIENT
Start: 2025-04-03 | End: 2025-04-03 | Stop reason: SDUPTHER

## 2025-04-03 RX ORDER — NALOXONE HYDROCHLORIDE 0.4 MG/ML
INJECTION, SOLUTION INTRAMUSCULAR; INTRAVENOUS; SUBCUTANEOUS PRN
OUTPATIENT
Start: 2025-04-03

## 2025-04-03 RX ORDER — DEXAMETHASONE SODIUM PHOSPHATE 10 MG/ML
INJECTION, SOLUTION INTRA-ARTICULAR; INTRALESIONAL; INTRAMUSCULAR; INTRAVENOUS; SOFT TISSUE
Status: DISCONTINUED | OUTPATIENT
Start: 2025-04-03 | End: 2025-04-03 | Stop reason: SDUPTHER

## 2025-04-03 RX ORDER — ONDANSETRON 2 MG/ML
INJECTION INTRAMUSCULAR; INTRAVENOUS
Status: DISCONTINUED | OUTPATIENT
Start: 2025-04-03 | End: 2025-04-03 | Stop reason: SDUPTHER

## 2025-04-03 RX ADMIN — Medication 15 MG: at 10:37

## 2025-04-03 RX ADMIN — LIDOCAINE HYDROCHLORIDE 70 MG: 20 INJECTION, SOLUTION EPIDURAL; INFILTRATION; INTRACAUDAL; PERINEURAL at 10:08

## 2025-04-03 RX ADMIN — LIDOCAINE HYDROCHLORIDE 18 ML: 20 INJECTION, SOLUTION INFILTRATION; PERINEURAL at 10:24

## 2025-04-03 RX ADMIN — LEVOFLOXACIN 500 MG: 500 INJECTION, SOLUTION INTRAVENOUS at 09:41

## 2025-04-03 RX ADMIN — FENTANYL CITRATE 25 MCG: 50 INJECTION, SOLUTION INTRAMUSCULAR; INTRAVENOUS at 10:22

## 2025-04-03 RX ADMIN — ONDANSETRON 4 MG: 2 INJECTION INTRAMUSCULAR; INTRAVENOUS at 10:37

## 2025-04-03 RX ADMIN — MIDAZOLAM HYDROCHLORIDE 2 MG: 1 INJECTION, SOLUTION INTRAMUSCULAR; INTRAVENOUS at 10:03

## 2025-04-03 RX ADMIN — BACITRACIN, NEOMYCIN, POLYMYXIN B 1 EACH: 400; 3.5; 5 OINTMENT TOPICAL at 10:36

## 2025-04-03 RX ADMIN — SODIUM CHLORIDE: 0.9 INJECTION, SOLUTION INTRAVENOUS at 10:01

## 2025-04-03 RX ADMIN — FENTANYL CITRATE 25 MCG: 50 INJECTION, SOLUTION INTRAMUSCULAR; INTRAVENOUS at 10:08

## 2025-04-03 RX ADMIN — PROPOFOL 170 MG: 10 INJECTION, EMULSION INTRAVENOUS at 10:08

## 2025-04-03 RX ADMIN — DEXAMETHASONE SODIUM PHOSPHATE 4 MG: 10 INJECTION INTRAMUSCULAR; INTRAVENOUS at 10:21

## 2025-04-03 NOTE — PROGRESS NOTES
Pt returned to CT holding via cart with MONTSERRAT and Aubrie MADRID. Pt SB on monitor, VSS. Pt sleeping, responds to voice and then falls back asleep when not interacting with staff. Biopsy site assessed and site soft and band aid c/d/I. No s&s of pain. Call light within reach, will continue to monitor.Electronically signed by Lisette Mott RN on 4/3/2025 at 11:14 AM  Pt becoming more alert with interaction with staff, oriented x4. O2 removed and VS remain stable. Pt denies pain. Pt provided PO fluids and tolerating well. Call light within reach. Will continue to monitor.Electronically signed by Lisette Mott RN on 4/3/2025 at 11:26 AM  Pt provided discharge instructions, questions answered and pt verbalized understanding. Pt Malina valdez, called at pt request to update that pt in recovery. Will call pt's ride after pt able to urinate. Pt continues to take PO fluids well.Electronically signed by Lisette Mott RN on 4/3/2025 at 11:46 AM

## 2025-04-03 NOTE — ANESTHESIA PRE PROCEDURE
Department of Anesthesiology  Preprocedure Note       Name:  Charles Jackson   Age:  60 y.o.  :  1965                                          MRN:  [id number]         Date:  4/3/2025      Surgeon: * No surgeons listed *    Procedure: * No procedures listed *    Medications prior to admission:   Prior to Admission medications    Medication Sig Start Date End Date Taking? Authorizing Provider   fluticasone (FLONASE) 50 MCG/ACT nasal spray  3/8/25  Yes Angel Joseph MD   clotrimazole-betamethasone (LOTRISONE) 1-0.05 % cream Apply topically 2 times daily 25  Yes Angel Joseph MD   ciprofloxacin (CIPRO) 500 MG tablet Take 1 tablet by mouth 2 times daily Start day prior and resume day after procedure 25  Yes Parviz Pritchett MD   ondansetron (ZOFRAN) 4 MG tablet Take 1 tablet by mouth 3 times daily as needed for Nausea or Vomiting 25  Yes Parviz Pritchett MD   alfuzosin (UROXATRAL) 10 MG extended release tablet TAKE 1 TABLET DAILY 24  Yes Parviz Pritchett MD       Current medications:    Current Outpatient Medications   Medication Sig Dispense Refill   • fluticasone (FLONASE) 50 MCG/ACT nasal spray      • clotrimazole-betamethasone (LOTRISONE) 1-0.05 % cream Apply topically 2 times daily     • ciprofloxacin (CIPRO) 500 MG tablet Take 1 tablet by mouth 2 times daily Start day prior and resume day after procedure 4 tablet 0   • ondansetron (ZOFRAN) 4 MG tablet Take 1 tablet by mouth 3 times daily as needed for Nausea or Vomiting 15 tablet 0   • alfuzosin (UROXATRAL) 10 MG extended release tablet TAKE 1 TABLET DAILY 90 tablet 3     Current Facility-Administered Medications   Medication Dose Route Frequency Provider Last Rate Last Admin   • levoFLOXacin (LEVAQUIN) 500 MG/100ML infusion 500 mg  500 mg IntraVENous Once Alison Aguilar APRN - CNP           Allergies:    Allergies   Allergen Reactions   • Ciprofloxacin Nausea Only       Problem List:  There is no problem list

## 2025-04-03 NOTE — DISCHARGE INSTRUCTIONS
HOME GOING INSTRUCTIONS FOLLOWING PROSTATE BIOPSY    1. ACTIVITY   Rest and avoid strenuous activity, such as bending or lifting heavy objects or straining with bowel movements for at least 48 hours.    Do not lift anything heavier than one gallon of milk x one week.  Avoid intercourse for at least one week.  Avoid baths, hot tubs, pools, and lakes for 2 weeks or longer if biopsy site not fully healed.   May shower after 24 hours and remove band aid. Pat biopsy site dry with clean towel.       2. DIET   Resume usual diet    3. MEDICATIONS   A. Resume home medications unless otherwise indicated by your physician.   B. May take non-aspirin pain reliever as needed.   C.  Continue antibiotic per physician's prescription.      Comments:    A small amount of pain after the biopsy should stay the same or begin to    lessen and should be easily controlled with mild medication.     4. DRAINAGE   Some blood in the urine, semen, or from the rectum is expected and may last for   three days after the biopsy.    5. IF YOU DEVELOP ANY OF THE FOLLOWING SYMPTOMS, CONTACT EITHER       YOUR FAMILY DOCTOR OR REPORT TO THE EMERGENCY ROOM FOR       FURTHER EVALUATION:   A.  Extreme pain   B. Active bleeding   C. Temperature of 100 degrees F and above   D. Pus in the urine    6. OTHER INSTRUCTIONS   If your doctor has not notified you in one week regarding the results of your   biopsy, please call that doctor's office.     Physician performing exam:  DR. ANSARI at [phone].  If you have questions of concerns tonight please call [facility] at [phone] and ask to speak to a RADIOLOGY RN.    If you are unable to contact the above physician and you feel you have a major problem, please go to the Emergency Room for treatment.

## 2025-04-03 NOTE — OR NURSING
Patient taken to CT Holding Room for recovery. Patient must urinate prior to discharge. Electronically signed by Giuliana Ann RN on 4/3/2025 at 10:44 AM      Total sedation: per anesthesia

## 2025-04-03 NOTE — PROGRESS NOTES
1225 IV removed and dressing applied. Patient band aide is clean,dry,intact. Patient awake and drinking water currently. Patient stated that he needs to void. Patient provided with urinal. Patient voided 400 ml of clear [facility] urine. Patient denies pain. VSS.    1227 Patient up and getting dressed. Friend called to come and transport patient back home.    1230 Patient ambulated to front of hospital. Friend to drive him home. Gait steady.

## 2025-04-03 NOTE — ANESTHESIA POSTPROCEDURE EVALUATION
Department of Anesthesiology  Postprocedure Note    Patient: Charles Jackson  MRN: [id number]  YOB: 1965  Date of evaluation: 4/3/2025    Procedure Summary       Date: 04/03/25 Room / Location: [facility] Vascular and Interventional Radiology; [facility] Special Procedure    Anesthesia Start: 1001 Anesthesia Stop:     Procedure: US BIOPSY PROSTATE NEEDLE/PUNCH Diagnosis: Elevated PSA    Scheduled Providers: Wilbur Murray MD Responsible Provider: Javier Harvey MD    Anesthesia Type: general ASA Status: 2            Anesthesia Type: No value filed.    Thad Phase I: Thad Score: 10    Thad Phase II:      Anesthesia Post Evaluation    Patient location during evaluation: bedside  Patient participation: complete - patient participated  Level of consciousness: awake and awake and alert  Airway patency: patent  Nausea & Vomiting: no nausea and no vomiting  Cardiovascular status: blood pressure returned to baseline and hemodynamically stable  Respiratory status: acceptable  Hydration status: euvolemic  Pain management: adequate        No notable events documented.

## 2025-04-03 NOTE — PROGRESS NOTES
Pt ambulated, gait steady, back to CT holding. Pt A&Ox4, respirations even and unlabored, visible skin WNL. Pt confirmed NPO after MN except water which states he drank up til little before 7:30am, will make anesthesia aware. Pt confirmed completed bowel prep and oral antibiotics. Pt denies taking any multivitamins or advil/motrin/ibuprofen for 2 weeks. Pt changed independently into hospital gown. Pt connected to monitor, SB on monitor. VSS. Pt denies pain. IV #20G started in left AC, GBR, flushed well. DR. Murray at bedside to explain procedure and risks and pt verbalized understanding, consent obtained. Pt resting comfortably. Ex-wife   Malina in lobby and pt gave permission to speak to her regarding PHI. Awaiting anesthesia.Electronically signed by Lisette Mott RN on 4/3/2025 at 9:31 AM  Dr. VILLALBA, anesthesiologist, at bedside to assess pt and explain anesthesia, pt verbalized understanding. Informed of pt drinking water til this AM. Ok to proceed. Anesthesia consent obtained. IV antibiotic started, see eMar. Pt awaiting to go to OR.Electronically signed by Lisette Mott RN on 4/3/2025 at 9:34 AM

## 2025-04-04 ENCOUNTER — TELEPHONE (OUTPATIENT)
Dept: INTERVENTIONAL RADIOLOGY/VASCULAR | Age: 60
End: 2025-04-04

## 2025-04-04 NOTE — TELEPHONE ENCOUNTER
Follow up call post IR biopsy: pt denies and issues or concerns at this time and re instructed may shower after 24 hours and pat the biopsy site dry with clean towel as pt questioned when he could shower.Electronically signed by Lisette Mott RN on 4/4/2025 at 10:27 AM

## 2025-04-21 ENCOUNTER — OFFICE VISIT (OUTPATIENT)
Age: 60
End: 2025-04-21
Payer: COMMERCIAL

## 2025-04-21 VITALS
BODY MASS INDEX: 27.92 KG/M2 | HEIGHT: 70 IN | WEIGHT: 195 LBS | DIASTOLIC BLOOD PRESSURE: 74 MMHG | HEART RATE: 63 BPM | SYSTOLIC BLOOD PRESSURE: 128 MMHG

## 2025-04-21 DIAGNOSIS — R97.20 ELEVATED PSA: Primary | ICD-10-CM

## 2025-04-21 PROCEDURE — 99213 OFFICE O/P EST LOW 20 MIN: CPT | Performed by: UROLOGY

## 2025-04-21 PROCEDURE — G8419 CALC BMI OUT NRM PARAM NOF/U: HCPCS | Performed by: UROLOGY

## 2025-04-21 PROCEDURE — 3017F COLORECTAL CA SCREEN DOC REV: CPT | Performed by: UROLOGY

## 2025-04-21 PROCEDURE — 1036F TOBACCO NON-USER: CPT | Performed by: UROLOGY

## 2025-04-21 PROCEDURE — G8427 DOCREV CUR MEDS BY ELIG CLIN: HCPCS | Performed by: UROLOGY

## 2025-04-21 ASSESSMENT — ENCOUNTER SYMPTOMS: ABDOMINAL PAIN: 0

## 2025-04-21 NOTE — PROGRESS NOTES
Subjective:      Patient ID: Charles Jackson is a 60 y.o. male    HPI  This is a 61 yo white male with h/o prior UTI, irregular LATOSHA/elevated PSA, BPH w/LUTs on alfuzosin back in follow-up after MRI fusion biopsy of prostate. Since the biopsy he is doing well and only had some hematospermia that has improved. He has no other complaints and I reviewed the interval pathology results in detail.      Prostate MR 25: PV 77.56 cc, PIRADs 3 and two 4 lesions, Biopsy negative  Trus/Prostate biopsy 10/12: neg  Cystoscopy for microhematuria 10/12: mild bulbous stricture, neg otherwise  Cytol/FISH : neg    Past Medical History:   Diagnosis Date    Hyperlipidemia     Incomplete bladder emptying     Prostate troubles      Past Surgical History:   Procedure Laterality Date    CYSTOSCOPY      PROSTATE BIOPSY  2025    Alysha transperineal prostate biopsy completed by Dr. Murray    PROSTATE SURGERY      biopsy    TONSILLECTOMY      US BIOPSY PROSTATE NEEDLE/PUNCH  4/3/2025    US BIOPSY PROSTATE NEEDLE/PUNCH 4/3/2025 [facility] SPECIAL PROCEDURE    VASECTOMY  2012     Social History     Socioeconomic History    Marital status:    Tobacco Use    Smoking status: Former     Current packs/day: 0.00     Types: Cigarettes     Quit date: 2017     Years since quittin.3    Smokeless tobacco: Never   Substance and Sexual Activity    Alcohol use: Yes     Alcohol/week: 2.0 standard drinks of alcohol     Types: 2 Cans of beer per week     Comment: a year    Drug use: No     Social Drivers of Health     Financial Resource Strain: Low Risk  (2025)    Received from [facility]    Overall Financial Resource Strain (CARDIA)     Difficulty of Paying Living Expenses: Not hard at all   Food Insecurity: No Food Insecurity (2025)    Received from [facility]    Hunger Vital Sign     Worried About Running Out of Food in the Last Year: Never true     Ran Out of Food in the Last Year: Never true   Transportation